# Patient Record
Sex: MALE | Race: ASIAN | NOT HISPANIC OR LATINO | URBAN - METROPOLITAN AREA
[De-identification: names, ages, dates, MRNs, and addresses within clinical notes are randomized per-mention and may not be internally consistent; named-entity substitution may affect disease eponyms.]

---

## 2020-10-06 ENCOUNTER — INPATIENT (INPATIENT)
Facility: HOSPITAL | Age: 75
LOS: 1 days | Discharge: HOME | DRG: 247 | End: 2020-10-08
Attending: INTERNAL MEDICINE | Admitting: HOSPITALIST
Payer: COMMERCIAL

## 2020-10-06 VITALS
DIASTOLIC BLOOD PRESSURE: 90 MMHG | SYSTOLIC BLOOD PRESSURE: 184 MMHG | OXYGEN SATURATION: 98 % | HEIGHT: 70 IN | TEMPERATURE: 98 F | WEIGHT: 169.98 LBS | HEART RATE: 75 BPM | RESPIRATION RATE: 18 BRPM

## 2020-10-06 DIAGNOSIS — R14.0 ABDOMINAL DISTENSION (GASEOUS): ICD-10-CM

## 2020-10-06 DIAGNOSIS — Z29.9 ENCOUNTER FOR PROPHYLACTIC MEASURES, UNSPECIFIED: ICD-10-CM

## 2020-10-06 DIAGNOSIS — R06.2 WHEEZING: ICD-10-CM

## 2020-10-06 DIAGNOSIS — R07.9 CHEST PAIN, UNSPECIFIED: ICD-10-CM

## 2020-10-06 DIAGNOSIS — E78.5 HYPERLIPIDEMIA, UNSPECIFIED: ICD-10-CM

## 2020-10-06 DIAGNOSIS — N40.0 BENIGN PROSTATIC HYPERPLASIA WITHOUT LOWER URINARY TRACT SYMPTOMS: ICD-10-CM

## 2020-10-06 DIAGNOSIS — N18.30 CHRONIC KIDNEY DISEASE, STAGE 3 UNSPECIFIED: ICD-10-CM

## 2020-10-06 DIAGNOSIS — I10 ESSENTIAL (PRIMARY) HYPERTENSION: ICD-10-CM

## 2020-10-06 LAB
ALBUMIN SERPL ELPH-MCNC: 4.3 G/DL — SIGNIFICANT CHANGE UP (ref 3.3–5)
ALP SERPL-CCNC: 87 U/L — SIGNIFICANT CHANGE UP (ref 40–120)
ALT FLD-CCNC: 17 U/L — SIGNIFICANT CHANGE UP (ref 10–45)
ANION GAP SERPL CALC-SCNC: 10 MMOL/L — SIGNIFICANT CHANGE UP (ref 5–17)
APTT BLD: 64 SEC — HIGH (ref 27.5–35.5)
AST SERPL-CCNC: 21 U/L — SIGNIFICANT CHANGE UP (ref 10–40)
BASOPHILS # BLD AUTO: 0.04 K/UL — SIGNIFICANT CHANGE UP (ref 0–0.2)
BASOPHILS NFR BLD AUTO: 0.8 % — SIGNIFICANT CHANGE UP (ref 0–2)
BILIRUB SERPL-MCNC: 1 MG/DL — SIGNIFICANT CHANGE UP (ref 0.2–1.2)
BUN SERPL-MCNC: 13 MG/DL — SIGNIFICANT CHANGE UP (ref 7–23)
CALCIUM SERPL-MCNC: 10.3 MG/DL — SIGNIFICANT CHANGE UP (ref 8.4–10.5)
CHLORIDE SERPL-SCNC: 102 MMOL/L — SIGNIFICANT CHANGE UP (ref 96–108)
CO2 SERPL-SCNC: 27 MMOL/L — SIGNIFICANT CHANGE UP (ref 22–31)
CREAT SERPL-MCNC: 1.59 MG/DL — HIGH (ref 0.5–1.3)
EOSINOPHIL # BLD AUTO: 0.11 K/UL — SIGNIFICANT CHANGE UP (ref 0–0.5)
EOSINOPHIL NFR BLD AUTO: 2.1 % — SIGNIFICANT CHANGE UP (ref 0–6)
GLUCOSE SERPL-MCNC: 92 MG/DL — SIGNIFICANT CHANGE UP (ref 70–99)
HCT VFR BLD CALC: 43.4 % — SIGNIFICANT CHANGE UP (ref 39–50)
HGB BLD-MCNC: 13.9 G/DL — SIGNIFICANT CHANGE UP (ref 13–17)
IMM GRANULOCYTES NFR BLD AUTO: 0.4 % — SIGNIFICANT CHANGE UP (ref 0–1.5)
INR BLD: 1 RATIO — SIGNIFICANT CHANGE UP (ref 0.88–1.16)
LYMPHOCYTES # BLD AUTO: 1.56 K/UL — SIGNIFICANT CHANGE UP (ref 1–3.3)
LYMPHOCYTES # BLD AUTO: 29.5 % — SIGNIFICANT CHANGE UP (ref 13–44)
MAGNESIUM SERPL-MCNC: 2.1 MG/DL — SIGNIFICANT CHANGE UP (ref 1.6–2.6)
MCHC RBC-ENTMCNC: 29.8 PG — SIGNIFICANT CHANGE UP (ref 27–34)
MCHC RBC-ENTMCNC: 32 GM/DL — SIGNIFICANT CHANGE UP (ref 32–36)
MCV RBC AUTO: 92.9 FL — SIGNIFICANT CHANGE UP (ref 80–100)
MONOCYTES # BLD AUTO: 0.48 K/UL — SIGNIFICANT CHANGE UP (ref 0–0.9)
MONOCYTES NFR BLD AUTO: 9.1 % — SIGNIFICANT CHANGE UP (ref 2–14)
NEUTROPHILS # BLD AUTO: 3.08 K/UL — SIGNIFICANT CHANGE UP (ref 1.8–7.4)
NEUTROPHILS NFR BLD AUTO: 58.1 % — SIGNIFICANT CHANGE UP (ref 43–77)
NRBC # BLD: 0 /100 WBCS — SIGNIFICANT CHANGE UP (ref 0–0)
NT-PROBNP SERPL-SCNC: 247 PG/ML — SIGNIFICANT CHANGE UP (ref 0–300)
PLATELET # BLD AUTO: 159 K/UL — SIGNIFICANT CHANGE UP (ref 150–400)
POTASSIUM SERPL-MCNC: 4 MMOL/L — SIGNIFICANT CHANGE UP (ref 3.5–5.3)
POTASSIUM SERPL-SCNC: 4 MMOL/L — SIGNIFICANT CHANGE UP (ref 3.5–5.3)
PROT SERPL-MCNC: 7.7 G/DL — SIGNIFICANT CHANGE UP (ref 6–8.3)
PROTHROM AB SERPL-ACNC: 12 SEC — SIGNIFICANT CHANGE UP (ref 10.6–13.6)
RAPID RVP RESULT: SIGNIFICANT CHANGE UP
RBC # BLD: 4.67 M/UL — SIGNIFICANT CHANGE UP (ref 4.2–5.8)
RBC # FLD: 13 % — SIGNIFICANT CHANGE UP (ref 10.3–14.5)
SARS-COV-2 RNA SPEC QL NAA+PROBE: SIGNIFICANT CHANGE UP
SODIUM SERPL-SCNC: 139 MMOL/L — SIGNIFICANT CHANGE UP (ref 135–145)
TROPONIN T, HIGH SENSITIVITY RESULT: 22 NG/L — SIGNIFICANT CHANGE UP (ref 0–51)
WBC # BLD: 5.29 K/UL — SIGNIFICANT CHANGE UP (ref 3.8–10.5)
WBC # FLD AUTO: 5.29 K/UL — SIGNIFICANT CHANGE UP (ref 3.8–10.5)

## 2020-10-06 PROCEDURE — 93010 ELECTROCARDIOGRAM REPORT: CPT

## 2020-10-06 PROCEDURE — 71045 X-RAY EXAM CHEST 1 VIEW: CPT | Mod: 26

## 2020-10-06 PROCEDURE — 99222 1ST HOSP IP/OBS MODERATE 55: CPT | Mod: GC

## 2020-10-06 PROCEDURE — 99285 EMERGENCY DEPT VISIT HI MDM: CPT

## 2020-10-06 PROCEDURE — 99223 1ST HOSP IP/OBS HIGH 75: CPT

## 2020-10-06 RX ORDER — ASPIRIN/CALCIUM CARB/MAGNESIUM 324 MG
81 TABLET ORAL DAILY
Refills: 0 | Status: DISCONTINUED | OUTPATIENT
Start: 2020-10-07 | End: 2020-10-08

## 2020-10-06 RX ORDER — LOSARTAN POTASSIUM 100 MG/1
100 TABLET, FILM COATED ORAL DAILY
Refills: 0 | Status: DISCONTINUED | OUTPATIENT
Start: 2020-10-07 | End: 2020-10-08

## 2020-10-06 RX ORDER — LANOLIN ALCOHOL/MO/W.PET/CERES
3 CREAM (GRAM) TOPICAL AT BEDTIME
Refills: 0 | Status: DISCONTINUED | OUTPATIENT
Start: 2020-10-06 | End: 2020-10-08

## 2020-10-06 RX ORDER — BUDESONIDE AND FORMOTEROL FUMARATE DIHYDRATE 160; 4.5 UG/1; UG/1
2 AEROSOL RESPIRATORY (INHALATION)
Refills: 0 | Status: DISCONTINUED | OUTPATIENT
Start: 2020-10-06 | End: 2020-10-08

## 2020-10-06 RX ORDER — HYDROCHLOROTHIAZIDE 25 MG
12.5 TABLET ORAL DAILY
Refills: 0 | Status: DISCONTINUED | OUTPATIENT
Start: 2020-10-07 | End: 2020-10-08

## 2020-10-06 RX ORDER — INFLUENZA VIRUS VACCINE 15; 15; 15; 15 UG/.5ML; UG/.5ML; UG/.5ML; UG/.5ML
0.5 SUSPENSION INTRAMUSCULAR ONCE
Refills: 0 | Status: COMPLETED | OUTPATIENT
Start: 2020-10-06 | End: 2020-10-08

## 2020-10-06 RX ORDER — HEPARIN SODIUM 5000 [USP'U]/ML
5000 INJECTION INTRAVENOUS; SUBCUTANEOUS EVERY 8 HOURS
Refills: 0 | Status: DISCONTINUED | OUTPATIENT
Start: 2020-10-06 | End: 2020-10-08

## 2020-10-06 RX ORDER — METOPROLOL TARTRATE 50 MG
50 TABLET ORAL
Refills: 0 | Status: DISCONTINUED | OUTPATIENT
Start: 2020-10-06 | End: 2020-10-08

## 2020-10-06 RX ORDER — ISOSORBIDE DINITRATE 5 MG/1
5 TABLET ORAL THREE TIMES A DAY
Refills: 0 | Status: DISCONTINUED | OUTPATIENT
Start: 2020-10-07 | End: 2020-10-08

## 2020-10-06 RX ORDER — TAMSULOSIN HYDROCHLORIDE 0.4 MG/1
0.4 CAPSULE ORAL AT BEDTIME
Refills: 0 | Status: DISCONTINUED | OUTPATIENT
Start: 2020-10-06 | End: 2020-10-08

## 2020-10-06 RX ADMIN — Medication 50 MILLIGRAM(S): at 23:12

## 2020-10-06 RX ADMIN — TAMSULOSIN HYDROCHLORIDE 0.4 MILLIGRAM(S): 0.4 CAPSULE ORAL at 21:44

## 2020-10-06 RX ADMIN — HEPARIN SODIUM 5000 UNIT(S): 5000 INJECTION INTRAVENOUS; SUBCUTANEOUS at 21:44

## 2020-10-06 RX ADMIN — Medication 3 MILLIGRAM(S): at 23:35

## 2020-10-06 NOTE — ED ADULT NURSE NOTE - NSIMPLEMENTINTERV_GEN_ALL_ED
Implemented All Fall with Harm Risk Interventions:  [place] to call system. Call bell, personal items and telephone within reach. Instruct patient to call for assistance. Room bathroom lighting operational. Non-slip footwear when patient is off stretcher. Physically safe environment: no spills, clutter or unnecessary equipment. Stretcher in lowest position, wheels locked, appropriate side rails in place. Provide visual cue, wrist band, yellow gown, etc. Monitor gait and stability. Monitor for mental status changes and reorient to person, place, and time. Review medications for side effects contributing to fall risk. Reinforce activity limits and safety measures with patient and family. Provide visual clues: red socks.

## 2020-10-06 NOTE — ED PROVIDER NOTE - OBJECTIVE STATEMENT
76 yo M with pmhx of HTN, HLD, CAD (stents 10yrs ago) presents with 6mths of intermittent chest pain with exertion and shortness of breath with exertion, and lower extremity swelling. Sent in by Dr. Jese Duran for admission for angio cath. Denies fever, chills, n/v/d/abd pain. Denies smoking, hx of COPD

## 2020-10-06 NOTE — H&P ADULT - HISTORY OF PRESENT ILLNESS
75M w/ CAD s/p stents, HTN, HLD, former smoker (quit 30 yr ago, 20pack year) enlarged prostate presents to [facility] for evaluation of chest pain per outpatient cardiology. Patient reports that he has been experiencing intermittent chest pain on exertion over the last 6 months. The pain has been progressive midsternal, "choking" character, 5-6/10 severity when ocurrsno reported radiation, associated w/ LEMON, associated with abdominal swelling and b/l lower extremity swelling, improves with rest. He reports that his exercise tolerance has been worsening and when attempting to walk one block will have to stop 3 to 4 times because of the discomfort. There is no CP, palpitations or sob at rest. He reports that he can lie flat at night time however there are times when he will awakening with sob which improves by sitting upright. Reports chronic cough for years with minimal sputum production which has not changed in character or frequency in the last year. No reported fever, chills, n/v/d, painful urination or change in urinary. Family history significant for MI in parents and siblings some occurring at early age in 30's-40's.    Per ED chart, patient received ASA 162mg x1 by EMS. Also demonstrated depression deemed to not have active SI/HI. Patient reports sadness that he will not get better but denies on medicine admit active SI/HI.    In the ED, VS 98.4, 184/90, 75, 18 98%RA

## 2020-10-06 NOTE — H&P ADULT - NSHPREVIEWOFSYSTEMS_GEN_ALL_CORE
CONSTITUTIONAL: No fever, no chills  EYES: No eye pain, no acute blindness  Mouth: no pain in mouth, no cuts  RESPIRATORY: chronic cough+, sob with exertion  CARDIOVASCULAR: CP with exertion, no palpitations  GASTROINTESTINAL: no abdominal pain, no n/v/d  GENITOURINARY: No dysuria, no hematuria  Heme: No easy bruising, no swelling of neck  NEUROLOGICAL: No seizure, No acute paralysis  SKIN: No itching, no rashes  MUSCULOSKELETAL: No acute joint pain, no joint swelling

## 2020-10-06 NOTE — ED PROVIDER NOTE - PROGRESS NOTE DETAILS
Resident Hersimran: cardiology consutled - they plan to do angio tomorrow. Want the patient to be NPO starting midnight. Want 8hrs trop (which is ordered).   Discussed case with Dr. Horn - agrees with admission

## 2020-10-06 NOTE — H&P ADULT - PROBLEM SELECTOR PLAN 3
cardiac work up as above  would benefit from RUQ US given LE swelling should cardiac eval not be consistent w/ cardiac etiology of fluid overload

## 2020-10-06 NOTE — H&P ADULT - PROBLEM SELECTOR PLAN 5
monitor CrCl daily  suspect admit Cr c/w chronic disease likely related to HTN/cardiovascular disease

## 2020-10-06 NOTE — H&P ADULT - PROBLEM SELECTOR PLAN 1
- cardiology evaluation reviewed. plan for [facility] 10/7/20  - no active chest pain at rest. agree less likely acs process  - c/w asa 81d, statin, metoprolol, ARB(losartan while inpatient)  - tele monitoring. troponin trend overnight. - cardiology evaluation reviewed. plan for [facility] 10/7/20  - no active chest pain at rest. agree less likely acs process  - c/w asa 81d, statin, metoprolol, ARB(losartan while inpatient), isodril  - tele monitoring. troponin trend overnight.

## 2020-10-06 NOTE — H&P ADULT - NSICDXPASTMEDICALHX_GEN_ALL_CORE_FT
PAST MEDICAL HISTORY:  Coronary Artery Disease treated medically    Hyperlipemia     Hypertension     Myocardial Infarction

## 2020-10-06 NOTE — ED PROVIDER NOTE - ATTENDING CONTRIBUTION TO CARE
75 M w/ pmh of cad s/p stents presents to the ER from Dr. Duran's  presents to the ER w/ chest pain and LEMON. Pt received two baby asa from EMS en route to the Er. Upon arrival to the ER he has 75 M w/ pmh of cad s/p stents over 10 years ago at [facility] (no records), HTN, HLD and prior smoker, presents to the ER from Dr. Duran's  presents to the ER w/ chest pain and LEMON. Pt received two baby asa from EMS en route to the Er. Now presents with chest pain for 6-8 months, now worsening. Given his history of CAD and risk factors, he likely has worsening CAD given his symptoms.  was sent to er for cath. Pt is nontoxic appearing I have reviewed the triage vital signs. Const: Well-nourished, Well-developed, Eyes: no conjunctival injection and no scleral icterus ENMT: Moist mucus membranes, CVS: +S1/S2, radial/DP pulse 2+ bilaterally RESP: Unlabored respiratory effort, Clear to auscultation bilaterally GI: Nontender/Nondistended soft abdomen, no CVA tenderness MSK: Extremities w/o deformity or ttp, Psych: Awake, Alert, & Orientedx3;  Appropriate mood and affect, cooperative   Will have labs, EKG and admission for cath per cardiology recommendations.

## 2020-10-06 NOTE — H&P ADULT - NSHPLABSRESULTS_GEN_ALL_CORE
Personally reviewed available labs, imaging and ekg  CBC Full  -  ( 06 Oct 2020 16:35 )  WBC Count : 5.29 K/uL  RBC Count : 4.67 M/uL  Hemoglobin : 13.9 g/dL  Hematocrit : 43.4 %  Platelet Count - Automated : 159 K/uL  Mean Cell Volume : 92.9 fl  Mean Cell Hemoglobin : 29.8 pg  Mean Cell Hemoglobin Concentration : 32.0 gm/dL  Auto Neutrophil # : 3.08 K/uL  Auto Lymphocyte # : 1.56 K/uL  Auto Monocyte # : 0.48 K/uL  Auto Eosinophil # : 0.11 K/uL  Auto Basophil # : 0.04 K/uL  Auto Neutrophil % : 58.1 %  Auto Lymphocyte % : 29.5 %  Auto Monocyte % : 9.1 %  Auto Eosinophil % : 2.1 %  Auto Basophil % : 0.8 %  10-06  139  |  102  |  13  ----------------------------<  92  4.0   |  27  |  1.59<H>  Ca    10.3      06 Oct 2020 16:35  Mg     2.1     10-06  TPro  7.7  /  Alb  4.3  /  TBili  1.0  /  DBili  x   /  AST  21  /  ALT  17  /  AlkPhos  87  10-06  PT/INR - ( 06 Oct 2020 16:35 )   PT: 12.0 sec;   INR: 1.00 ratio    PTT - ( 06 Oct 2020 16:35 )  PTT:64.0 sec  Troponin T, High Sensitivity Result: 22:(10.06.20 @ 16:35)  Serum Pro-Brain Natriuretic Peptide: 247 pg/mL (10.06.20 @ 16:35)    Imaging:  CXR does not demonstrate lobar opacification  EKG: sinus 80 with pvc, no georgette c/w stemi present

## 2020-10-06 NOTE — H&P ADULT - NSHPPHYSICALEXAM_GEN_ALL_CORE
Vital Signs Last 24 Hrs  T(C): 36.7 (06 Oct 2020 20:00), Max: 36.9 (06 Oct 2020 15:42)  T(F): 98.1 (06 Oct 2020 20:00), Max: 98.4 (06 Oct 2020 15:42)  HR: 71 (06 Oct 2020 20:00) (61 - 81)  BP: 163/75 (06 Oct 2020 20:00) (158/89 - 184/90)  RR: 18 (06 Oct 2020 20:00) (18 - 18)  SpO2: 98% (06 Oct 2020 20:00) (98% - 100%) on RA    GENERAL: NAD, well-developed  HEAD:  Atraumatic, Normocephalic  EYES: EOMI, PERRL, conjunctiva and sclera clear  Mouth: MMM, no lesions  NECK: Supple, no appreciable masses  Lung: normal work of breathing, b/l wheeze in bases and mid lung  Chest: S1&S2+, rrr, no m/r/g appreciated  ABDOMEN: bs+, soft, nt, abdominal distention however it is not tense  : No goncalves catheter, no CVA tenderness  EXTREMITIES:  radial pulse present b/l, PT present b/l, pitting edema below knee b/l present  Neuro: A&Ox3, no flaccid paralysis in extremities appreciated  SKIN: warm and dry, no visible purulence in exposed areas

## 2020-10-06 NOTE — H&P ADULT - PROBLEM SELECTOR PLAN 2
suspect patient has COPD given history of smoking and chronic cough w/ mild mucous production  - pending cardiac w/u would likely benefit from CT chest following cards w/u.  - empirically will start symbicort  - CXR does not demonstrate acute pathology, and no overt acute heart failure on medicine admit exam

## 2020-10-06 NOTE — ED PROVIDER NOTE - CLINICAL SUMMARY MEDICAL DECISION MAKING FREE TEXT BOX
6mths of chest pain and shortness of breath with exertion. Sent in by cardiologist for cardiac angio. Elevated BP. Labs, CXR.

## 2020-10-06 NOTE — CONSULT NOTE ADULT - SUBJECTIVE AND OBJECTIVE BOX
Patient seen and evaluated at bedside    Chief Complaint: Chest pain x 6 monhts    HPI:  75M with a history of CAD s/p PCI over 10 years ago at [facility] (no records), HTN, HLD and prior smoker who presented from his cardiologist's office (Dr. Eloy Castellano) for cardiac cath. Patient has had 6-8 months of worsening central sharp chest pain without radiation. Currently, it is 4/10 and is associated with shortness of breath with exertion. At rest he has minimal symptoms. Of note, he has gained 10-20 pounds around this time with fluid accumulating mostly in his abdomen. He otherwise denied syncope, palpitations, orthopnea, PND, f/c/n/v/d. Per report from his cardiologist, he has had a recent echo and stress test, both of which were unremarkable.    PMHx:   CAD s/p PCI 10 years ago  HTN  HLD    PSHx:   NA    Allergies:  penicillin (Rash)      Home Meds:  ASA  Statin  Metoprolol  Imdur  Valsartan    Current Medications:       FAMILY HISTORY:  MI's in mother, father and siblings    Social History:   Smoking History: prior user  Alcohol Use: NA  Drug Use: NA    REVIEW OF SYSTEMS:  Constitutional:     [x ] negative [ ] fevers [ ] chills [ ] weight loss [ ] weight gain  HEENT:                  [x ] negative [ ] dry eyes [ ] eye irritation [ ] postnasal drip [ ] nasal congestion  CV:                        see above [ ] negative  [ ] chest pain [ ] orthopnea [ ] palpitations [ ] murmur  Resp:                     [x] negative [ ] cough  [ ] dyspnea [ ] wheezing [ ] sputum [ ]hemoptysis  GI:                          [ x] negative [ ] nausea [ ] vomiting [ ] diarrhea [ ] constipation [ ] abd pain [ ] dysphagia   :                        [x ] negative [ ] dysuria [ ] nocturia [ ] hematuria [ ] increased urinary frequency  Musculoskeletal: [x ] negative [ ] back pain [ ] myalgias [ ] arthralgias [ ] fracture  Skin:                       [x ] negative [ ] rash [ ] itch  Neurological:        [x ] negative [ ] headache [ ] dizziness [ ] syncope [ ] weakness [ ] numbness  Psychiatric:           [ x] negative [ ] anxiety [ ] depression  Endocrine:            [x ] negative [ ] diabetes [ ] thyroid problem  Heme/Lymph:      [x ] negative [ ] anemia [ ] bleeding problem  Allergic/Immune: x[ ] negative [ ] itchy eyes [ ] nasal discharge [ ] hives [ ] angioedema    [x ] All other systems negative  [ ] Unable to assess ROS due to    Physical Exam:  T(F): 97.7 (10-06), Max: 98.4 (10-06)  HR: 81 (10-06) (75 - 81)  BP: 175/93 (10-06) (175/93 - 184/90)  RR: 18 (10-06)  SpO2: 100% (10-06)  GENERAL: No acute distress, well-developed  HEAD:  Atraumatic, Normocephalic  ENT: EOMI, PERRLA, conjunctiva and sclera clear, Neck supple, moist mucosa  CHEST/LUNG: Clear to auscultation bilaterally; No wheeze, equal breath sounds bilaterally   BACK: No spinal tenderness  HEART: Regular rate and rhythm; No murmurs, rubs, or gallops; JVD to 1/3 neck at 30 degrees  ABDOMEN: Soft, Nontender, distended; Bowel sounds present  EXTREMITIES:  No clubbing, cyanosis, or edema  PSYCH: Nl behavior, nl affect  NEUROLOGY: AAOx3, non-focal, cranial nerves intact  SKIN: Normal color, No rashes or lesions    Cardiovascular Diagnostic Testing:    ECG: Personally reviewed: NSR w/ PVCs, unchanged rom 2019    Echo: reported by his cardiologist to be unremarkable    Stress Testing: reported by his cardiologist to be unremarkable    Cath: no records available from [facility] regarding last cath    Imaging:    CXR: Personally reviewed    Labs: Personally reviewed                        13.9   5.29  )-----------( 159      ( 06 Oct 2020 16:35 )             43.4     10-06    139  |  102  |  13  ----------------------------<  92  4.0   |  27  |  1.59<H>    Ca    10.3      06 Oct 2020 16:35  Mg     2.1     10-06    TPro  7.7  /  Alb  4.3  /  TBili  1.0  /  DBili  x   /  AST  21  /  ALT  17  /  AlkPhos  87  10-06    PT/INR - ( 06 Oct 2020 16:35 )   PT: 12.0 sec;   INR: 1.00 ratio         PTT - ( 06 Oct 2020 16:35 )  PTT:64.0 sec  Serum Pro-Brain Natriuretic Peptide: 247 pg/mL (10-06 @ 16:35)         Patient seen and evaluated at bedside    Chief Complaint: Chest pain x 6 monhts    HPI:  75M with a history of CAD s/p PCI over 10 years ago at [facility] (no records), HTN, HLD and prior smoker who presented from his cardiologist's office (Dr. Eloy Castellano) for cardiac cath. Patient has had 6-8 months of worsening central sharp chest pain without radiation. Currently, it is 4/10 and is associated with shortness of breath with exertion. At rest he has minimal symptoms. Of note, he has gained 10-20 pounds around this time with fluid accumulating mostly in his abdomen. He otherwise denied syncope, palpitations, orthopnea, PND, f/c/n/v/d. Per report from his cardiologist, he has had a recent echo and stress test, both of which were unremarkable.    PMHx:   CAD s/p PCI 10 years ago  HTN  HLD    PSHx:   NA    Allergies:  penicillin (Rash)      Home Meds:  ASA  Statin  Metoprolol  Imdur  Valsartan    Current Medications:       FAMILY HISTORY:  MI's in mother, father and siblings    Social History:   Smoking History: prior user  Alcohol Use: NA  Drug Use: NA    REVIEW OF SYSTEMS:  Constitutional:     [x ] negative [ ] fevers [ ] chills [ ] weight loss [ ] weight gain  HEENT:                  [x ] negative [ ] dry eyes [ ] eye irritation [ ] postnasal drip [ ] nasal congestion  CV:                        see above [ ] negative  [ ] chest pain [ ] orthopnea [ ] palpitations [ ] murmur  Resp:                     [x] negative [ ] cough  [ ] dyspnea [ ] wheezing [ ] sputum [ ]hemoptysis  GI:                          [ x] negative [ ] nausea [ ] vomiting [ ] diarrhea [ ] constipation [ ] abd pain [ ] dysphagia   :                        [x ] negative [ ] dysuria [ ] nocturia [ ] hematuria [ ] increased urinary frequency  Musculoskeletal: [x ] negative [ ] back pain [ ] myalgias [ ] arthralgias [ ] fracture  Skin:                       [x ] negative [ ] rash [ ] itch  Neurological:        [x ] negative [ ] headache [ ] dizziness [ ] syncope [ ] weakness [ ] numbness  Psychiatric:           [ x] negative [ ] anxiety [ ] depression  Endocrine:            [x ] negative [ ] diabetes [ ] thyroid problem  Heme/Lymph:      [x ] negative [ ] anemia [ ] bleeding problem  Allergic/Immune: x[ ] negative [ ] itchy eyes [ ] nasal discharge [ ] hives [ ] angioedema    [x ] All other systems negative  [ ] Unable to assess ROS due to    Physical Exam:  T(F): 97.7 (10-06), Max: 98.4 (10-06)  HR: 81 (10-06) (75 - 81)  BP: 175/93 (10-06) (175/93 - 184/90)  RR: 18 (10-06)  SpO2: 100% (10-06)  GENERAL: No acute distress, well-developed  HEAD:  Atraumatic, Normocephalic  ENT: EOMI, PERRLA, conjunctiva and sclera clear, Neck supple, moist mucosa  CHEST/LUNG: Clear to auscultation bilaterally; No wheeze, equal breath sounds bilaterally   BACK: No spinal tenderness  HEART: Regular rate and rhythm; No murmurs, rubs, or gallops; no JVD  ABDOMEN: Soft, Nontender, distended; Bowel sounds present  EXTREMITIES:  No clubbing, cyanosis, or edema  PSYCH: Nl behavior, nl affect  NEUROLOGY: AAOx3, non-focal, cranial nerves intact  SKIN: Normal color, No rashes or lesions    Cardiovascular Diagnostic Testing:    ECG: Personally reviewed: NSR w/ PVCs, unchanged rom 2019    Echo: reported by his cardiologist to be unremarkable    Stress Testing: reported by his cardiologist to be unremarkable    Cath: no records available from [facility] regarding last cath    Imaging:    CXR: Personally reviewed    Labs: Personally reviewed                        13.9   5.29  )-----------( 159      ( 06 Oct 2020 16:35 )             43.4     10-06    139  |  102  |  13  ----------------------------<  92  4.0   |  27  |  1.59<H>    Ca    10.3      06 Oct 2020 16:35  Mg     2.1     10-06    TPro  7.7  /  Alb  4.3  /  TBili  1.0  /  DBili  x   /  AST  21  /  ALT  17  /  AlkPhos  87  10-06    PT/INR - ( 06 Oct 2020 16:35 )   PT: 12.0 sec;   INR: 1.00 ratio         PTT - ( 06 Oct 2020 16:35 )  PTT:64.0 sec  Serum Pro-Brain Natriuretic Peptide: 247 pg/mL (10-06 @ 16:35)         Patient seen and evaluated at bedside    Chief Complaint: Chest pain x 6 monhts    HPI:  75M with a history of CAD s/p PCI over 10 years ago at [facility] (no records), HTN, HLD and prior smoker.  Presents from his cardiologist's office (Dr. Eloy Castellano) for cardiac cath. Patient has had 6-8 months of worsening central sharp chest pain without radiation. Currently, it is 4/10 and is associated with shortness of breath with exertion. At rest he has minimal symptoms. Of note, he has gained 10-20 pounds around this time.  He denies syncope, palpitations, orthopnea, PND.   Per report from his cardiologist, he has had a recent echo and stress test, both of which were unremarkable.      PMHx:   CAD s/p PCI 10 years ago  HTN  HLD    Allergies:  penicillin (Rash)    Home Medications:  Aspirin Enteric Coated 81 mg oral delayed release tablet: 1 tab(s) orally once a day (06 Oct 2020 21:00)  isosorbide dinitrate 5 mg oral tablet: 1 tab(s) orally once a day (06 Oct 2020 21:00)  Metoprolol Tartrate 50 mg oral tablet: 1 tab(s) orally 2 times a day (06 Oct 2020 21:00)  tamsulosin 0.4 mg oral capsule: 1 cap(s) orally once a day (06 Oct 2020 21:00)  valsartan-hydrochlorothiazide 320mg-12.5mg oral tablet: 1 tab(s) orally once a day (06 Oct 2020 21:00)  statin      FAMILY HISTORY: MI's in mother, father and siblings    Social History:   Smoking History: prior user  Alcohol Use: NA  Drug Use: NA    REVIEW OF SYSTEMS:  Constitutional:     [x ] negative [ ] fevers [ ] chills [ ] weight loss [ ] weight gain  HEENT:                  [x ] negative [ ] dry eyes [ ] eye irritation [ ] postnasal drip [ ] nasal congestion  Resp:                     [x] negative [ ] cough  [ ] dyspnea [ ] wheezing [ ] sputum [ ]hemoptysis  GI:                          [ x] negative [ ] nausea [ ] vomiting [ ] diarrhea [ ] constipation [ ] abd pain [ ] dysphagia   :                        [x ] negative [ ] dysuria [ ] nocturia [ ] hematuria [ ] increased urinary frequency  Musculoskeletal: [x ] negative [ ] back pain [ ] myalgias [ ] arthralgias [ ] fracture  Skin:                       [x ] negative [ ] rash [ ] itch  Neurological:        [x ] negative [ ] headache [ ] dizziness [ ] syncope [ ] weakness [ ] numbness  Psychiatric:           [ x] negative [ ] anxiety [ ] depression  Endocrine:            [x ] negative [ ] diabetes [ ] thyroid problem  Heme/Lymph:      [x ] negative [ ] anemia [ ] bleeding problem  Allergic/Immune: x[ ] negative [ ] itchy eyes [ ] nasal discharge [ ] hives [ ] angioedema    [x ] All other systems negative      Physical Exam:  T(F): 97.7 (10-06), Max: 98.4 (10-06)  HR: 81 (10-06) (75 - 81)  BP: 175/93 (10-06) (175/93 - 184/90)  RR: 18 (10-06)    SpO2: 100% (10-06)  GENERAL: No acute distress, well-developed  HEAD:  Atraumatic, Normocephalic  ENT: EOMI, PERRLA, conjunctiva and sclera clear, Neck supple, moist mucosa  CHEST/LUNG: Clear to auscultation bilaterally; No wheeze, equal breath sounds bilaterally   BACK: No spinal tenderness  HEART: Regular rate and rhythm; No murmurs, rubs, or gallops; no JVD  ABDOMEN: Soft, Nontender, distended; Bowel sounds present  EXTREMITIES:  No clubbing, cyanosis, or edema  PSYCH: Nl behavior, nl affect  NEUROLOGY: AAOx3, non-focal, cranial nerves intact  SKIN: Normal color, No rashes or lesions      ECG: NSR w/ PVCs, unchanged rom 2019    Echo: reported by his cardiologist to be unremarkable    Stress Testing: reported by his cardiologist to be unremarkable    Cath: no records available from [facility] regarding last cath      Xray Chest 1 View AP/PA (10.06.20 @ 16:37)   EXAM:  XR CHEST AP OR PA 1V                        PROCEDURE DATE:  10/06/2020      INTERPRETATION:  CLINICAL INFORMATION: Chest pain, dyspnea on exertion and bilateral lower extremity swelling. Evaluate for pleural effusion.    TECHNIQUE: Frontal view of the chest.    COMPARISON: No comparison available.    FINDINGS:  The lungs are clear.  No pleural effusion or pneumothorax.  Heart size is within normal limits.  No acute abnormality within visible osseous structures.    IMPRESSION:   Clear lungs.    GER THOMASON M.D., RESIDENT RADIOLOGIST  This document has been electronically signed.  ERIC TREJO M.D., ATTENDING RADIOLOGIST  This document has been electronically signed. Oct  6 2020  8:36PM      Labs:                       13.9   5.29  )-----------( 159      ( 06 Oct 2020 16:35 )             43.4     10-06  139  |  102  |  13  ----------------------------<  92  4.0   |  27  |  1.59<H>    Ca    10.3      06 Oct 2020 16:35  Mg     2.1     10-06    TPro  7.7  /  Alb  4.3  /  TBili  1.0  /  DBili  x   /  AST  21  /  ALT  17  /  AlkPhos  87  10-06    PT/INR - ( 06 Oct 2020 16:35 )   PT: 12.0 sec;   INR: 1.00 ratio    PTT - ( 06 Oct 2020 16:35 )  PTT:64.0 sec    Serum Pro-Brain Natriuretic Peptide: 247 pg/mL (10-06 @ 16:35)

## 2020-10-06 NOTE — ED ADULT NURSE NOTE - OBJECTIVE STATEMENT
75 yr old male to Ed, Via [facility], from cardiologist, c/o SOB with exertional Chest pain intermittently. x 6 months. worsening last few days. Pt awake alert and orientedx3 Denies fever or chills Denies Covid. Pedal +2 pedal edema. 02 sat 100% on r/a Responds approp to verbal and tactile stimuli HX HTN, HLD , MI, X2 stents , Only takes ASA 162mg given per EMS. Kidney stone, poss BPH on Flomax. Denies abd pain . Denies N/V. Denies change in BM Denies diff or burn on urination. 75 yr old male to Ed, Via [facility], from cardiologist, c/o SOB with exertional Chest pain intermittently. x 6 months. worsening last few days. Pt awake alert and orientedx3 Denies fever or chills Denies Covid. Pedal +2 pedal edema. 02 sat 100% on r/a Responds approp to verbal and tactile stimuli HX HTN, HLD , MI, X2 stents , Only takes ASA 162mg given per EMS. Kidney stone, poss BPH on Flomax. Denies abd pain . Denies N/V. Denies change in BM Denies diff or burn on urination. MD attending A Petrossian spoke to pt in re to stating " I want to hurt myself." Pt states to MD, "  I have been feeling like this for years, I get sad when I think about everyone who has  and then I think I don't feel well." Denies a plan in regards to hurting himself psych con not necessary per ED MD. Pt on CM. Made comfortable.

## 2020-10-06 NOTE — H&P ADULT - ASSESSMENT
75M w/ CAD s/p stents, HTN, HLD, former smoker (quit 30 yr ago, 20pack year) enlarged prostate presents to [facility] for evaluation of chest pain per outpatient cardiology admit to medicine for further evaluation with LHC planned per cardiology.

## 2020-10-06 NOTE — ED ADULT NURSE REASSESSMENT NOTE - NS ED NURSE REASSESS COMMENT FT1
Received report from Hortensia SETHI. Patient A&Ox4. Denies any chest pain, shortness of breath, dizziness, nausea or vomiting, fever/chills currently. Reports having shortness of breath and chest pain upon exertion, none at rest. Pending admission to inpatient telemetry unit. Plan of care discussed. Safety and comfort measures maintained.

## 2020-10-06 NOTE — H&P ADULT - ATTENDING COMMENTS
Ricardo Sullivan MD  Nocturnist-Medicine Attending  Department of [facility] Medicine  pager: [phone] (available from 20:00 to 08:00)

## 2020-10-06 NOTE — CONSULT NOTE ADULT - ASSESSMENT
75M with a history of CAD s/p PCI over 10 years ago at [facility] (no records), HTN, HLD and prior smoker who presents with typical chest pain for 6-8 months, now worsening. Given his history of CAD and risk factors, he likely has worsening CAD given his symptoms. However, he does not appear to have ACS given the chronicity, lack of ECG changes and negative enzyme. Were he to rule in for ACS, his Ibeth score is 111 (6% probability of death at 6 months) and LANDON score is 3 (13% risk at 14 days). Currently, he is HDS and despite an increase in weight gain over this time frame, he is not hypoxic or in respiratory distress.  - admit to telemetry  - echo  - would not obtain stress test at this time since his primary cardiologist has already performed the test recently  - NPO for cath tomorrow, consent placed in chart  - con't home medications; will discuss medication optimization after cath  - will continue to follow  - discussed with cath attending, Dr. Jaguar Villegas MD  Cardiology Fellow PGY-4  [facility]  (w) [phone]    For all new consults and questions:  [email or web address]   Login: [email or web address]   75M with a history of CAD s/p PCI over 10 years ago at [facility] (no records), HTN, HLD and prior smoker who presents with typical chest pain for 6-8 months, now worsening. Given his history of CAD and risk factors, he likely has worsening CAD given his symptoms. However, he does not appear to have ACS given the chronicity, lack of ECG changes and negative enzyme. Were he to rule in for ACS, his Ibeth score is 111 (6% probability of death at 6 months) and LANDON score is 3 (13% risk at 14 days). Currently, he is HDS and despite an increase in weight gain over this time frame, he does not appear volume overloaded given normal BNP, negative CXR and adequate oxygenation without supplemental requirements.  - admit to telemetry  - would not obtain stress test at this time since his primary cardiologist has already performed the test recently  - NPO for cath tomorrow, consent placed in chart  - echo after cath  - con't home medications; will discuss medication optimization after cath  - will continue to follow  - discussed with cath attending, Dr. Jaguar Villegas MD  Cardiology Fellow PGY-4  [facility]  (w) [phone]    For all new consults and questions:  [email or web address]   Login: [email or web address]   75 M with a history of CAD s/p PCI over 10 years ago at [facility] (no records), HTN, HLD and prior smoker  Now presents with chest pain for 6-8 months, now worsening. Given his history of CAD and risk factors, he likely has worsening CAD given his symptoms. However, he does not appear to have ACS given the chronicity, lack of ECG changes and negative enzyme. Were he to rule in for ACS, his Ibeth score is 111 (6% probability of death at 6 months) and LANDON score is 3 (13% risk at 14 days). Currently, he is HDS and despite an increase in weight gain over this time frame, he does not appear volume overloaded given normal BNP, negative CXR and adequate oxygenation without supplemental requirements.  - admit to telemetry  - NPO for cath tomorrow, consent placed in chart  - echo after cath  - continue home medications; will discuss medication optimization after cath  - will continue to follow  - discussed with cath attending, Dr. Jaguar Villegas MD  Cardiology Fellow PGY-4  [facility] - [facility]  (w) [phone]    Humberto Herring M.D.  Cardiology Attending, Consult Service    All Cardiology service information can be found 24/7 on [email or web address], password: [email or web address]

## 2020-10-07 ENCOUNTER — TRANSCRIPTION ENCOUNTER (OUTPATIENT)
Age: 75
End: 2020-10-07

## 2020-10-07 DIAGNOSIS — Z02.9 ENCOUNTER FOR ADMINISTRATIVE EXAMINATIONS, UNSPECIFIED: ICD-10-CM

## 2020-10-07 DIAGNOSIS — E78.49 OTHER HYPERLIPIDEMIA: ICD-10-CM

## 2020-10-07 DIAGNOSIS — I20.9 ANGINA PECTORIS, UNSPECIFIED: ICD-10-CM

## 2020-10-07 LAB
ANION GAP SERPL CALC-SCNC: 9 MMOL/L — SIGNIFICANT CHANGE UP (ref 5–17)
APTT BLD: 69.4 SEC — HIGH (ref 27.5–35.5)
BUN SERPL-MCNC: 14 MG/DL — SIGNIFICANT CHANGE UP (ref 7–23)
CALCIUM SERPL-MCNC: 9.5 MG/DL — SIGNIFICANT CHANGE UP (ref 8.4–10.5)
CHLORIDE SERPL-SCNC: 103 MMOL/L — SIGNIFICANT CHANGE UP (ref 96–108)
CO2 SERPL-SCNC: 27 MMOL/L — SIGNIFICANT CHANGE UP (ref 22–31)
CREAT SERPL-MCNC: 1.58 MG/DL — HIGH (ref 0.5–1.3)
GLUCOSE SERPL-MCNC: 92 MG/DL — SIGNIFICANT CHANGE UP (ref 70–99)
HCT VFR BLD CALC: 41.5 % — SIGNIFICANT CHANGE UP (ref 39–50)
HCV AB S/CO SERPL IA: 0.1 S/CO — SIGNIFICANT CHANGE UP (ref 0–0.99)
HCV AB SERPL-IMP: SIGNIFICANT CHANGE UP
HGB BLD-MCNC: 13.4 G/DL — SIGNIFICANT CHANGE UP (ref 13–17)
INR BLD: 1.04 RATIO — SIGNIFICANT CHANGE UP (ref 0.88–1.16)
MAGNESIUM SERPL-MCNC: 2 MG/DL — SIGNIFICANT CHANGE UP (ref 1.6–2.6)
MCHC RBC-ENTMCNC: 29.9 PG — SIGNIFICANT CHANGE UP (ref 27–34)
MCHC RBC-ENTMCNC: 32.3 GM/DL — SIGNIFICANT CHANGE UP (ref 32–36)
MCV RBC AUTO: 92.6 FL — SIGNIFICANT CHANGE UP (ref 80–100)
NRBC # BLD: 0 /100 WBCS — SIGNIFICANT CHANGE UP (ref 0–0)
PHOSPHATE SERPL-MCNC: 3.9 MG/DL — SIGNIFICANT CHANGE UP (ref 2.5–4.5)
PLATELET # BLD AUTO: 141 K/UL — LOW (ref 150–400)
POTASSIUM SERPL-MCNC: 3.9 MMOL/L — SIGNIFICANT CHANGE UP (ref 3.5–5.3)
POTASSIUM SERPL-SCNC: 3.9 MMOL/L — SIGNIFICANT CHANGE UP (ref 3.5–5.3)
PROTHROM AB SERPL-ACNC: 12.5 SEC — SIGNIFICANT CHANGE UP (ref 10.6–13.6)
RBC # BLD: 4.48 M/UL — SIGNIFICANT CHANGE UP (ref 4.2–5.8)
RBC # FLD: 12.9 % — SIGNIFICANT CHANGE UP (ref 10.3–14.5)
SARS-COV-2 IGG SERPL QL IA: NEGATIVE — SIGNIFICANT CHANGE UP
SARS-COV-2 IGM SERPL IA-ACNC: 0.09 INDEX — SIGNIFICANT CHANGE UP
SODIUM SERPL-SCNC: 139 MMOL/L — SIGNIFICANT CHANGE UP (ref 135–145)
TROPONIN T, HIGH SENSITIVITY RESULT: 24 NG/L — SIGNIFICANT CHANGE UP (ref 0–51)
TROPONIN T, HIGH SENSITIVITY RESULT: 26 NG/L — SIGNIFICANT CHANGE UP (ref 0–51)
WBC # BLD: 4.68 K/UL — SIGNIFICANT CHANGE UP (ref 3.8–10.5)
WBC # FLD AUTO: 4.68 K/UL — SIGNIFICANT CHANGE UP (ref 3.8–10.5)

## 2020-10-07 PROCEDURE — 99152 MOD SED SAME PHYS/QHP 5/>YRS: CPT

## 2020-10-07 PROCEDURE — 93306 TTE W/DOPPLER COMPLETE: CPT | Mod: 26

## 2020-10-07 PROCEDURE — 99233 SBSQ HOSP IP/OBS HIGH 50: CPT | Mod: GC,25

## 2020-10-07 PROCEDURE — 93010 ELECTROCARDIOGRAM REPORT: CPT | Mod: 77

## 2020-10-07 PROCEDURE — 93010 ELECTROCARDIOGRAM REPORT: CPT | Mod: 76

## 2020-10-07 PROCEDURE — 92928 PRQ TCAT PLMT NTRAC ST 1 LES: CPT | Mod: LD,59

## 2020-10-07 PROCEDURE — 93571 IV DOP VEL&/PRESS C FLO 1ST: CPT | Mod: 26,LD

## 2020-10-07 PROCEDURE — 93458 L HRT ARTERY/VENTRICLE ANGIO: CPT | Mod: 26,59

## 2020-10-07 PROCEDURE — 99233 SBSQ HOSP IP/OBS HIGH 50: CPT

## 2020-10-07 RX ORDER — SODIUM CHLORIDE 9 MG/ML
250 INJECTION INTRAMUSCULAR; INTRAVENOUS; SUBCUTANEOUS ONCE
Refills: 0 | Status: COMPLETED | OUTPATIENT
Start: 2020-10-07 | End: 2020-10-07

## 2020-10-07 RX ORDER — NITROGLYCERIN 6.5 MG
0.5 CAPSULE, EXTENDED RELEASE ORAL ONCE
Refills: 0 | Status: COMPLETED | OUTPATIENT
Start: 2020-10-07 | End: 2020-10-07

## 2020-10-07 RX ORDER — CLOPIDOGREL BISULFATE 75 MG/1
75 TABLET, FILM COATED ORAL DAILY
Refills: 0 | Status: DISCONTINUED | OUTPATIENT
Start: 2020-10-08 | End: 2020-10-08

## 2020-10-07 RX ORDER — PANTOPRAZOLE SODIUM 20 MG/1
40 TABLET, DELAYED RELEASE ORAL ONCE
Refills: 0 | Status: COMPLETED | OUTPATIENT
Start: 2020-10-07 | End: 2020-10-07

## 2020-10-07 RX ORDER — ACETAMINOPHEN 500 MG
1000 TABLET ORAL ONCE
Refills: 0 | Status: COMPLETED | OUTPATIENT
Start: 2020-10-07 | End: 2020-10-07

## 2020-10-07 RX ORDER — SODIUM CHLORIDE 9 MG/ML
1000 INJECTION INTRAMUSCULAR; INTRAVENOUS; SUBCUTANEOUS
Refills: 0 | Status: DISCONTINUED | OUTPATIENT
Start: 2020-10-07 | End: 2020-10-08

## 2020-10-07 RX ORDER — ATORVASTATIN CALCIUM 80 MG/1
80 TABLET, FILM COATED ORAL AT BEDTIME
Refills: 0 | Status: DISCONTINUED | OUTPATIENT
Start: 2020-10-07 | End: 2020-10-08

## 2020-10-07 RX ADMIN — Medication 50 MILLIGRAM(S): at 11:52

## 2020-10-07 RX ADMIN — PANTOPRAZOLE SODIUM 40 MILLIGRAM(S): 20 TABLET, DELAYED RELEASE ORAL at 13:13

## 2020-10-07 RX ADMIN — Medication 400 MILLIGRAM(S): at 11:52

## 2020-10-07 RX ADMIN — SODIUM CHLORIDE 75 MILLILITER(S): 9 INJECTION INTRAMUSCULAR; INTRAVENOUS; SUBCUTANEOUS at 08:43

## 2020-10-07 RX ADMIN — Medication 50 MILLIGRAM(S): at 22:41

## 2020-10-07 RX ADMIN — BUDESONIDE AND FORMOTEROL FUMARATE DIHYDRATE 2 PUFF(S): 160; 4.5 AEROSOL RESPIRATORY (INHALATION) at 05:32

## 2020-10-07 RX ADMIN — TAMSULOSIN HYDROCHLORIDE 0.4 MILLIGRAM(S): 0.4 CAPSULE ORAL at 21:23

## 2020-10-07 RX ADMIN — Medication 0.5 INCH(S): at 14:18

## 2020-10-07 RX ADMIN — BUDESONIDE AND FORMOTEROL FUMARATE DIHYDRATE 2 PUFF(S): 160; 4.5 AEROSOL RESPIRATORY (INHALATION) at 18:21

## 2020-10-07 RX ADMIN — Medication 30 MILLILITER(S): at 11:52

## 2020-10-07 RX ADMIN — ISOSORBIDE DINITRATE 5 MILLIGRAM(S): 5 TABLET ORAL at 13:43

## 2020-10-07 RX ADMIN — Medication 81 MILLIGRAM(S): at 05:37

## 2020-10-07 RX ADMIN — ISOSORBIDE DINITRATE 5 MILLIGRAM(S): 5 TABLET ORAL at 21:23

## 2020-10-07 RX ADMIN — Medication 12.5 MILLIGRAM(S): at 05:31

## 2020-10-07 RX ADMIN — SODIUM CHLORIDE 750 MILLILITER(S): 9 INJECTION INTRAMUSCULAR; INTRAVENOUS; SUBCUTANEOUS at 08:44

## 2020-10-07 RX ADMIN — Medication 1000 MILLIGRAM(S): at 12:30

## 2020-10-07 RX ADMIN — Medication 3 MILLIGRAM(S): at 21:25

## 2020-10-07 RX ADMIN — ISOSORBIDE DINITRATE 5 MILLIGRAM(S): 5 TABLET ORAL at 05:32

## 2020-10-07 RX ADMIN — LOSARTAN POTASSIUM 100 MILLIGRAM(S): 100 TABLET, FILM COATED ORAL at 05:31

## 2020-10-07 RX ADMIN — ATORVASTATIN CALCIUM 80 MILLIGRAM(S): 80 TABLET, FILM COATED ORAL at 21:23

## 2020-10-07 RX ADMIN — HEPARIN SODIUM 5000 UNIT(S): 5000 INJECTION INTRAVENOUS; SUBCUTANEOUS at 21:23

## 2020-10-07 NOTE — DISCHARGE NOTE PROVIDER - NSDCCPCAREPLAN_GEN_ALL_CORE_FT
PRINCIPAL DISCHARGE DIAGNOSIS  Diagnosis: Chest pain  Assessment and Plan of Treatment: Do not stop your aspirin or Plavix unless instructed to do so by your cardiologist, they help keep your stented arteries open.   No heavy lifting or pushing/pulling with procedure arm for 2 weeks. No driving for 2 days. You may shower 24 hours following the procedure but avoid baths/swimming for 1 week. Check your wrist site for bleeding and/or swelling daily following procedure and call your doctor immediately if it occurs or if you experience increased pain at the site. Follow up with your cardiologist in 1-2 weeks. You may call [facility] if you have any questions/concerns regarding your procedure [phone].      SECONDARY DISCHARGE DIAGNOSES  Diagnosis: HTN (hypertension)  Assessment and Plan of Treatment: Continue with your blood pressure medications; eat a heart healthy diet with low salt diet; exercise regularly (consult with your physician or cardiologist first); maintain a heart healthy weight; if you smoke - quit (A resource to help you stop smoking is the [facility] [employer] – phone number [phone].); include healthy ways to manage stress. Continue to follow with your primary care physician or cardiologist.    Diagnosis: HLD (hyperlipidemia)  Assessment and Plan of Treatment: Continue with your cholesterol medications. Eat a heart healthy diet that is low in saturated fats and salt, and includes whole grains, fruits, vegetables and lean protein; exercise regularly (consult with your physician or cardiologist first); maintain a heart healthy weight; if you smoke - quit (A resource to help you stop smoking is the [facility] [employer] for Tobacco Control – phone number [phone].). Continue to follow with your primary physician or cardiologist.

## 2020-10-07 NOTE — CHART NOTE - NSCHARTNOTEFT_GEN_A_CORE
Pt c/o 7/10 chest pain post procedure: EKG without changes. VSS. Pt given Tylenol 1g IV and maalox without relief. Pt states pain is worse with lying down (ekg not c/w pericardititis). Will administer pantoprazole 40 mg po start and continue to monitor closely.    Santosh Carpenter, NP    x [phone]

## 2020-10-07 NOTE — PROGRESS NOTE ADULT - PROBLEM SELECTOR PROBLEM 5
Stage 3 chronic kidney disease, unspecified whether stage 3a or 3b CKD Hyperlipidemia, unspecified hyperlipidemia type

## 2020-10-07 NOTE — DISCHARGE NOTE PROVIDER - CARE PROVIDER_API CALL
Jaguar Bourne  CARDIOVASCULAR DISEASE  [address] [place] [address]  Phone: [phone]  Fax: [phone]  Follow Up Time: 2 weeks

## 2020-10-07 NOTE — PROGRESS NOTE ADULT - PROBLEM SELECTOR PLAN 5
monitor CrCl daily  suspect admit Cr c/w chronic disease likely related to HTN/cardiovascular disease Continue Lipitor 80 mg

## 2020-10-07 NOTE — PROGRESS NOTE ADULT - PROBLEM SELECTOR PLAN 3
cardiac work up as above  would benefit from RUQ US given LE swelling should cardiac eval not be consistent w/ cardiac etiology of fluid overload Continue metoprolol, losartan, HCTZ, Isordil

## 2020-10-07 NOTE — PROGRESS NOTE ADULT - ASSESSMENT
75M w/ CAD s/p stents, HTN, HLD, former smoker (quit 30 yr ago, 20pack year) enlarged prostate presents to [facility] for evaluation of chest pain s/p LHC with JONNY x 2 to LAD

## 2020-10-07 NOTE — PROGRESS NOTE ADULT - SUBJECTIVE AND OBJECTIVE BOX
Yogi Mejia MD  Pager [phone]  Spectra [phone]  Cell Phone [phone]    Patient is a 75y old  Male who presents with a chief complaint of 75M presents per outpatient cardiology for evaluation of chest pain (06 Oct 2020 21:10)        SUBJECTIVE / OVERNIGHT EVENTS: Patient seen s/p cath- c/o chest discomfort      MEDICATIONS  (STANDING):  aspirin enteric coated 81 milliGRAM(s) Oral daily  atorvastatin 80 milliGRAM(s) Oral at bedtime  budesonide  80 MICROgram(s)/formoterol 4.5 MICROgram(s) Inhaler 2 Puff(s) Inhalation two times a day  heparin   Injectable 5000 Unit(s) SubCutaneous every 8 hours  hydrochlorothiazide 12.5 milliGRAM(s) Oral daily  influenza   Vaccine 0.5 milliLiter(s) IntraMuscular once  isosorbide   dinitrate Tablet (ISORDIL) 5 milliGRAM(s) Oral three times a day  losartan 100 milliGRAM(s) Oral daily  metoprolol tartrate 50 milliGRAM(s) Oral two times a day  sodium chloride 0.9%. 1000 milliLiter(s) (75 mL/Hr) IV Continuous <Continuous>  tamsulosin 0.4 milliGRAM(s) Oral at bedtime    MEDICATIONS  (PRN):  melatonin 3 milliGRAM(s) Oral at bedtime PRN Insomnia      Vital Signs Last 24 Hrs  T(C): 36.4 (07 Oct 2020 11:40), Max: 37.1 (07 Oct 2020 07:11)  T(F): 97.6 (07 Oct 2020 11:40), Max: 98.7 (07 Oct 2020 07:11)  HR: 66 (07 Oct 2020 13:10) (54 - 82)  BP: 172/96 (07 Oct 2020 13:10) (107/61 - 185/94)  BP(mean): --  RR: 17 (07 Oct 2020 13:10) (17 - 20)  SpO2: 98% (07 Oct 2020 13:10) (95% - 100%)  CAPILLARY BLOOD GLUCOSE        I&O's Summary    07 Oct 2020 07:01  -  07 Oct 2020 13:30  --------------------------------------------------------  IN: 120 mL / OUT: 300 mL / NET: -180 mL          PHYSICAL EXAM  GENERAL: NAD, well-developed  HEAD:  Atraumatic, Normocephalic  EYES: EOMI, PERRLA, conjunctiva and sclera clear  CHEST/LUNG: Clear to auscultation bilaterally; No wheeze  HEART: Regular rate and rhythm; No murmurs, rubs, or gallops  ABDOMEN: Soft, Nontender, Nondistended; Bowel sounds present  EXTREMITIES:  2+ Peripheral Pulses, No clubbing, cyanosis, or edema; R wrist pressure device in place  PSYCH: AAOx3      LABS:                        13.4   4.68  )-----------( 141      ( 07 Oct 2020 06:05 )             41.5     10-07    139  |  103  |  14  ----------------------------<  92  3.9   |  27  |  1.58<H>    Ca    9.5      07 Oct 2020 06:05  Phos  3.9     10-07  Mg     2.0     10-07    TPro  7.7  /  Alb  4.3  /  TBili  1.0  /  DBili  x   /  AST  21  /  ALT  17  /  AlkPhos  87  10-06    PT/INR - ( 07 Oct 2020 06:05 )   PT: 12.5 sec;   INR: 1.04 ratio         PTT - ( 07 Oct 2020 06:05 )  PTT:69.4 sec            RADIOLOGY & ADDITIONAL TESTS:    Imaging Personally Reviewed:  Consultant(s) Notes Reviewed:    Care Discussed with Consultants/Other Providers:

## 2020-10-07 NOTE — CONSULT NOTE ADULT - SUBJECTIVE AND OBJECTIVE BOX
Patient is a 75y old  Male who presents with a chief complaint of 75M presents per outpatient cardiology for evaluation of chest pain (07 Oct 2020 14:46)      HPI:  75M w/ CAD s/p stents, HTN, HLD, former smoker (quit 30 yr ago, 20pack year) enlarged prostate presents to [facility] for evaluation of chest pain per outpatient cardiology. Patient reports that he has been experiencing intermittent chest pain on exertion over the last 6 months. The pain has been progressive midsternal, "choking" character, 5-6/10 severity when ocurrsno reported radiation, associated w/ LEMON, associated with abdominal swelling and b/l lower extremity swelling, improves with rest. He reports that his exercise tolerance has been worsening and when attempting to walk one block will have to stop 3 to 4 times because of the discomfort. There is no CP, palpitations or sob at rest. He reports that he can lie flat at night time however there are times when he will awakening with sob which improves by sitting upright. Reports chronic cough for years with minimal sputum production which has not changed in character or frequency in the last year. No reported fever, chills, n/v/d, painful urination or change in urinary. Family history significant for MI in parents and siblings some occurring at early age in 30's-40's.    Per ED chart, patient received ASA 162mg x1 by EMS. Also demonstrated depression deemed to not have active SI/HI. Patient reports sadness that he will not get better but denies on medicine admit active SI/HI.    In the ED, VS 98.4, 184/90, 75, 18 98%RA (06 Oct 2020 21:10)    Today, he is S/P PCI to LAD  Reported CP after the procedure, being monitored    PAST MEDICAL & SURGICAL HISTORY:  Myocardial Infarction    Coronary Artery Disease  treated medically    Hyperlipemia    Hypertension    History of Hemorrhoidectomy        Review of Systems:   CONSTITUTIONAL: No fever, weight loss, or fatigue  EYES: No eye pain, visual disturbances, or discharge  ENMT:  No difficulty hearing, tinnitus, vertigo; No sinus or throat pain  NECK: No pain or stiffness  BREASTS: No pain, masses, or nipple discharge  RESPIRATORY: No cough, wheezing, chills or hemoptysis; No shortness of breath  CARDIOVASCULAR: No palpitations, dizziness, or leg swelling  GASTROINTESTINAL: No abdominal or epigastric pain. No nausea, vomiting, or hematemesis; No diarrhea or constipation. No melena or hematochezia.  GENITOURINARY: No dysuria, frequency, hematuria, or incontinence  NEUROLOGICAL: No headaches, memory loss, loss of strength, numbness, or tremors  SKIN: No itching, burning, rashes, or lesions   LYMPH NODES: No enlarged glands  ENDOCRINE: No heat or cold intolerance; No hair loss  MUSCULOSKELETAL: No joint pain or swelling; No muscle, back, or extremity pain  PSYCHIATRIC: No depression, anxiety, mood swings, or difficulty sleeping  HEME/LYMPH: No easy bruising, or bleeding gums  ALLERY AND IMMUNOLOGIC: No hives or eczema    Allergies    penicillin (Rash)  penicillins (Rash)    Intolerances        Social History:     FAMILY HISTORY:  FH: heart attack        MEDICATIONS  (STANDING):  aspirin enteric coated 81 milliGRAM(s) Oral daily  atorvastatin 80 milliGRAM(s) Oral at bedtime  budesonide  80 MICROgram(s)/formoterol 4.5 MICROgram(s) Inhaler 2 Puff(s) Inhalation two times a day  heparin   Injectable 5000 Unit(s) SubCutaneous every 8 hours  hydrochlorothiazide 12.5 milliGRAM(s) Oral daily  influenza   Vaccine 0.5 milliLiter(s) IntraMuscular once  isosorbide   dinitrate Tablet (ISORDIL) 5 milliGRAM(s) Oral three times a day  losartan 100 milliGRAM(s) Oral daily  metoprolol tartrate 50 milliGRAM(s) Oral two times a day  sodium chloride 0.9%. 1000 milliLiter(s) (75 mL/Hr) IV Continuous <Continuous>  tamsulosin 0.4 milliGRAM(s) Oral at bedtime    MEDICATIONS  (PRN):  melatonin 3 milliGRAM(s) Oral at bedtime PRN Insomnia        CAPILLARY BLOOD GLUCOSE        I&O's Summary    07 Oct 2020 07:01  -  07 Oct 2020 19:48  --------------------------------------------------------  IN: 120 mL / OUT: 500 mL / NET: -380 mL        PHYSICAL EXAM:  Vital Signs Last 24 Hrs  T(C): 36.4 (07 Oct 2020 11:40), Max: 37.1 (07 Oct 2020 07:11)  T(F): 97.6 (07 Oct 2020 11:40), Max: 98.7 (07 Oct 2020 07:11)  HR: 58 (07 Oct 2020 18:40) (54 - 82)  BP: 138/72 (07 Oct 2020 18:40) (107/61 - 190/91)  BP(mean): --  RR: 16 (07 Oct 2020 18:40) (16 - 20)  SpO2: 97% (07 Oct 2020 18:40) (95% - 99%)    GENERAL: NAD, well-developed  HEAD:  Atraumatic, Normocephalic  EYES: EOMI, PERRLA, conjunctiva and sclera clear  NECK: Supple, No JVD  CHEST/LUNG: Clear to auscultation bilaterally; No wheeze  HEART: Regular rate and rhythm; No murmurs, rubs, or gallops  ABDOMEN: Soft, Nontender, Nondistended; Bowel sounds present  EXTREMITIES:  2+ Peripheral Pulses, No clubbing, cyanosis, or edema  PSYCH: AAOx3  NEUROLOGY: non-focal  SKIN: No rashes or lesions    LABS:                        13.4   4.68  )-----------( 141      ( 07 Oct 2020 06:05 )             41.5     10-07    139  |  103  |  14  ----------------------------<  92  3.9   |  27  |  1.58<H>    Ca    9.5      07 Oct 2020 06:05  Phos  3.9     10-07  Mg     2.0     10-07    TPro  7.7  /  Alb  4.3  /  TBili  1.0  /  DBili  x   /  AST  21  /  ALT  17  /  AlkPhos  87  10-06    PT/INR - ( 07 Oct 2020 06:05 )   PT: 12.5 sec;   INR: 1.04 ratio         PTT - ( 07 Oct 2020 06:05 )  PTT:69.4 sec          RADIOLOGY & ADDITIONAL TESTS:    Imaging Personally Reviewed:    Consultant(s) Notes Reviewed:      Care Discussed with Consultants/Other Providers:

## 2020-10-07 NOTE — CHART NOTE - NSCHARTNOTEFT_GEN_A_CORE
patient c/o left sided chest pain 7/10 radiating to L back, ECG done with changes in inferior leads and V2-3. Dr. Bourne aware, will administed nitro and reassess after 15 min. patient c/o left sided chest pain 7/10 radiating to L back, ECG done with changes in  V2-3. Dr. Bourne aware, will administer nitro and reassess ECG after 15 min of nitro administration.

## 2020-10-07 NOTE — DISCHARGE NOTE PROVIDER - HOSPITAL COURSE
HPI:  75M w/ CAD s/p stents, HTN, HLD, former smoker (quit 30 yr ago, 20pack year) enlarged prostate presents to [facility] for evaluation of chest pain per outpatient cardiology. Patient reports that he has been experiencing intermittent chest pain on exertion over the last 6 months. The pain has been progressive midsternal, "choking" character, 5-6/10 severity when ocurrsno reported radiation, associated w/ LEMON, associated with abdominal swelling and b/l lower extremity swelling, improves with rest. He reports that his exercise tolerance has been worsening and when attempting to walk one block will have to stop 3 to 4 times because of the discomfort. There is no CP, palpitations or sob at rest. He reports that he can lie flat at night time however there are times when he will awakening with sob which improves by sitting upright. Reports chronic cough for years with minimal sputum production which has not changed in character or frequency in the last year. No reported fever, chills, n/v/d, painful urination or change in urinary. Family history significant for MI in parents and siblings some occurring at early age in 30's-40's.    Per ED chart, patient received ASA 162mg x1 by EMS. Also demonstrated depression deemed to not have active SI/HI. Patient reports sadness that he will not get better but denies on medicine admit active SI/HI.    In the ED, VS 98.4, 184/90, 75, 18 98%RA (06 Oct 2020 21:10)    10/7 cardiac cath with 2 stents to the LAD. Right radial site without swelling, bleeding. HPI:  75M w/ CAD s/p stents, HTN, HLD, former smoker (quit 30 yr ago, 20pack year) enlarged prostate presents to [facility] for evaluation of chest pain per outpatient cardiology. Patient reports that he has been experiencing intermittent chest pain on exertion over the last 6 months. The pain has been progressive midsternal, "choking" character, 5-6/10 severity when ocurrsno reported radiation, associated w/ LEMON, associated with abdominal swelling and b/l lower extremity swelling, improves with rest. He reports that his exercise tolerance has been worsening and when attempting to walk one block will have to stop 3 to 4 times because of the discomfort. There is no CP, palpitations or sob at rest. He reports that he can lie flat at night time however there are times when he will awakening with sob which improves by sitting upright. Reports chronic cough for years with minimal sputum production which has not changed in character or frequency in the last year. No reported fever, chills, n/v/d, painful urination or change in urinary. Family history significant for MI in parents and siblings some occurring at early age in 30's-40's.    Per ED chart, patient received ASA 162mg x1 by EMS. Also demonstrated depression deemed to not have active SI/HI. Patient reports sadness that he will not get better but denies on medicine admit active SI/HI.    In the ED, VS 98.4, 184/90, 75, 18 98%RA (06 Oct 2020 21:10)    10/7 cardiac cath with 2 stents to the LAD. Right radial site without swelling, bleeding.     10/8 stable for discharge on ASA/Plavix with outpatient cardiology follow up. 35 minutes spent discharging the patient

## 2020-10-07 NOTE — CHART NOTE - NSCHARTNOTEFT_GEN_A_CORE
Post Procedure Chest Pain    Pt continue to complain of chest pain post procedure: 6/10 repeat EKG after NTG transdermal reveals SR @ 63 bpm, TW normalized inferiorly, ST V1 down to 1/2 mm from about 1 mm, V2 down to .5 mm from about 1.5mm. Pt sbp 149/79 (decreased). Will continue to monitor closely.    Santosh Carpenter, NP   x [phone]

## 2020-10-07 NOTE — PROGRESS NOTE ADULT - PROBLEM SELECTOR PLAN 1
Cont asa 81d, statin, metoprolol, ARB(losartan while inpatient), isodril  - tele monitoring. troponin trend overnight. JONNY x 2 to LAD  Cont ASA/Plavix

## 2020-10-07 NOTE — DISCHARGE NOTE PROVIDER - NSDCMRMEDTOKEN_GEN_ALL_CORE_FT
Aspirin Enteric Coated 81 mg oral delayed release tablet: 1 tab(s) orally once a day  isosorbide dinitrate 5 mg oral tablet: 1 tab(s) orally once a day  Metoprolol Tartrate 50 mg oral tablet: 1 tab(s) orally 2 times a day  tamsulosin 0.4 mg oral capsule: 1 cap(s) orally once a day  valsartan-hydrochlorothiazide 320mg-12.5mg oral tablet: 1 tab(s) orally once a day   Aspirin Enteric Coated 81 mg oral delayed release tablet: 1 tab(s) orally once a day  atorvastatin 80 mg oral tablet: 1 tab(s) orally once a day (at bedtime)  clopidogrel 75 mg oral tablet: 1 tab(s) orally once a day  isosorbide dinitrate 5 mg oral tablet: 1 tab(s) orally once a day  Metoprolol Tartrate 50 mg oral tablet: 1 tab(s) orally 2 times a day  tamsulosin 0.4 mg oral capsule: 1 cap(s) orally once a day  valsartan-hydrochlorothiazide 320mg-12.5mg oral tablet: 1 tab(s) orally once a day

## 2020-10-07 NOTE — DISCHARGE NOTE PROVIDER - NSDCFUADDINST_GEN_ALL_CORE_FT
Wound Care:   the day AFTER your procedure remove bandage GENTTLY, and clean using  mild soap and gentle warm, water stream, pat dry. leave OPEN to air. YOU MAY SHOWER   DO NOT apply lotions, creams, ointments, powder, parfumes to your incision site  DO NOT SOAK your site for 1 week ( no baths, no pools, no tubs, etc...)  Check  your groin and /or wirst daily.A small amount of bruising, and soarness are normal    ACTIVITY: for 24 hours   - DO NOT DRIVE  - DO NOT make any important decisions or sign legal documents   - DO NOT operate heavy machinaries   - you may resume sexual activity in 48 hours, unless otherwise instructed by your cardiologist     If your procedure was done through the WRIST: for the NEXT 3DAYS:  - avoid pushing, pulling, with that affected wrist   - avoid repeated movement of that hand and wrist ( eg: typing, hammering)  - DO NOT LIFT anything more than 5 lbs     If your procedure was done through the GROIN: for the NEXT 5 DAYS  - Limit climbing stairs, DO NOT soak in bathtub or pool  - no strenous activities, pushing, pulling, straining  - Do not lift anything 10lbs or heavier     MEDICATION:   take your medications as explained ( see discharge paperwork)   If you received a STENT, you will be taking antiplatelet medications to KEEP YOUR STENT OPEN ( eg: Aspirin, Plavix, Brilinta, Effient, etc).  Take as prescribed DO NOT STOP taking them without consulting with your cardiologist first.     Follow heart healthy diet reccomended by your doctor, , if you smoke STOP SMOKING ( may call [phone] for center of tobacco control if you need assistance)     CALL your doctor to make appointment in 2 WEEKS     ***CALL YOUR DOCTOR***  if you experience: fever, chills, body aches, or severe pain, swelling, redness, heat or yellow discharge at incision site  If you experience Bleeding or excruciating pain at the procedural site, sweliing ( golf ball size) at your procedural site  If you experience CHEST PAIN  If you experience extremity numbness, tingling, temperature change ( of your procedural site)   If you are unable to reach your doctor, you may contact:   -Cardiology Office at [facility] at [phone] or   - [facility] [phone] - [facility] [phone]

## 2020-10-07 NOTE — PROGRESS NOTE ADULT - PROBLEM SELECTOR PLAN 2
suspect patient has COPD given history of smoking and chronic cough w/ mild mucous production  - pending cardiac w/u would likely benefit from CT chest following cards w/u.  - empirically will start symbicort  - CXR does not demonstrate acute pathology, and no overt acute heart failure on medicine admit exam Suspect patient has COPD  Empirically started on Symbicort  Recommend outpatient PFTs

## 2020-10-07 NOTE — PROGRESS NOTE ADULT - ASSESSMENT
75 M with a history of CAD s/p PCI over 10 years ago at [facility] (no records), HTN, HLD and prior smoker, presented with chest pain, s/p LHC with JONNY to mLAD 80% stenosis.    #CAD s/p PCI 10/07  -- LHC 10/07 with mLAD 80% stenosis, LM 30% stenosis, LCx 30% stenosis, OM1 80% stenosis, s/p JONNY to mLAD stenosis, OM1 was small vessel and ostial, no PCI was done  -- DAPT with ASA and plavix for 1 year  -- TTE EF 65%, no WMA  -- Lipitor 80mg qhs  -- C/w metop tartrate 50 BID and losartan 100 daily  -- Will need close cardiology f/u as outpatient    Attending of record: Dr. Herring    Signed by  Elroy Scruggs MD  PGY4 Cardiology 75 M with a history of CAD s/p PCI over 10 years ago at [facility] (no records), HTN, HLD and prior smoker.  Presented with chest pain, now s/p [facility] with JONNY to mLAD 80% stenosis.    REC:  #CAD s/p PCI 10/07 -   -- [facility] 10/07 with mLAD 80% stenosis, LM 30% stenosis, LCx 30% stenosis, OM1 80% stenosis, s/p JONNY to mLAD stenosis, OM1 was small vessel and ostial, no PCI was done  -- DAPT with ASA and Plavix for 1 year  -- TTE EF 65%, no WMA  -- Lipitor 80mg qhs  -- C/w metop tartrate 50 BID and losartan 100 daily  -- Will need close cardiology f/u as outpatient      Signed by  Elroy Scruggs MD  PGY4 Cardiology    Humberto Herring M.D.  Cardiology Attending, Consult Service    All Cardiology service information can be found 24/7 on [email or web address], password: [email or web address]

## 2020-10-07 NOTE — PROGRESS NOTE ADULT - SUBJECTIVE AND OBJECTIVE BOX
Cardiology Fellow Consult Progress Note    Subjective:    S/p C today with JONNY to mLAD 80% lesion. No shawn-procedural complication.     Physical Exam:  T(F): 97.6 (10-07), Max: 98.7 (10-07)  HR: 64 (10-07) (54 - 82)  BP: 174/91 (10-07) (107/61 - 190/91)  RR: 18 (10-07)  SpO2: 96% (10-07)  GENERAL: No acute distress, well-developed  HEAD:  Atraumatic, Normocephalic  ENT: EOMI, PERRLA, conjunctiva and sclera clear, Neck supple, No JVD, moist mucosa  CHEST/LUNG: Clear to auscultation bilaterally; No wheeze, equal breath sounds bilaterally   BACK: No spinal tenderness  HEART: Regular rate and rhythm; No murmurs, rubs, or gallops  ABDOMEN: Soft, Nontender, Nondistended; Bowel sounds present  EXTREMITIES:  No clubbing, cyanosis, or edema  PSYCH: Nl behavior, nl affect  NEUROLOGY: AAOx3, non-focal, cranial nerves intact  SKIN: Normal color, No rashes or lesions    Cardiovascular diagnostic testings: personally reviewed    Imaging diagnostic testings: personally reviewed    Labs: Personally reviewed                        13.4   4.68  )-----------( 141      ( 07 Oct 2020 06:05 )             41.5     10-07    139  |  103  |  14  ----------------------------<  92  3.9   |  27  |  1.58<H>    Ca    9.5      07 Oct 2020 06:05  Phos  3.9     10-07  Mg     2.0     10-07    TPro  7.7  /  Alb  4.3  /  TBili  1.0  /  DBili  x   /  AST  21  /  ALT  17  /  AlkPhos  87  10-06    PT/INR - ( 07 Oct 2020 06:05 )   PT: 12.5 sec;   INR: 1.04 ratio         PTT - ( 07 Oct 2020 06:05 )  PTT:69.4 sec    CARDIAC MARKERS ( 07 Oct 2020 06:05 )  26 ng/L / x     / x     / x     / x     / x      CARDIAC MARKERS ( 07 Oct 2020 00:53 )  24 ng/L / x     / x     / x     / x     / x      CARDIAC MARKERS ( 06 Oct 2020 16:35 )  22 ng/L / x     / x     / x     / x     / x            Serum Pro-Brain Natriuretic Peptide: 247 pg/mL (10-06 @ 16:35)           Cardiology Fellow Consult Progress Note    Subjective:  S/p C today with JONNY to mLAD 80% lesion. No shawn-procedural complication.       MEDICATIONS  (STANDING):  aspirin enteric coated 81 milliGRAM(s) Oral daily  atorvastatin 80 milliGRAM(s) Oral at bedtime  budesonide  80 MICROgram(s)/formoterol 4.5 MICROgram(s) Inhaler 2 Puff(s) Inhalation two times a day  heparin   Injectable 5000 Unit(s) SubCutaneous every 8 hours  hydrochlorothiazide 12.5 milliGRAM(s) Oral daily  influenza   Vaccine 0.5 milliLiter(s) IntraMuscular once  isosorbide   dinitrate Tablet (ISORDIL) 5 milliGRAM(s) Oral three times a day  losartan 100 milliGRAM(s) Oral daily  metoprolol tartrate 50 milliGRAM(s) Oral two times a day  sodium chloride 0.9%. 1000 milliLiter(s) (75 mL/Hr) IV Continuous <Continuous>  tamsulosin 0.4 milliGRAM(s) Oral at bedtime      ROS:  Unchanged      Physical Exam:  T(F): 97.6 (10-07), Max: 98.7 (10-07)  HR: 64 (10-07) (54 - 82)  BP: 174/91 (10-07) (107/61 - 190/91)  RR: 18 (10-07)  SpO2: 96% (10-07)    GENERAL: No acute distress, well-developed  HEAD:  Atraumatic, Normocephalic  ENT: EOMI, PERRLA, conjunctiva and sclera clear, Neck supple, No JVD, moist mucosa  CHEST/LUNG: Clear to auscultation bilaterally; No wheeze, equal breath sounds bilaterally   BACK: No spinal tenderness  HEART: Regular rate and rhythm; No murmurs, rubs, or gallops  ABDOMEN: Soft, Nontender, Nondistended; Bowel sounds present  EXTREMITIES:  No clubbing, cyanosis, or edema  PSYCH: Nl behavior, nl affect  NEUROLOGY: AAOx3, non-focal, cranial nerves intact  SKIN: Normal color, No rashes or lesions      Labs:                      13.4   4.68  )-----------( 141      ( 07 Oct 2020 06:05 )             41.5     10-07  139  |  103  |  14  ----------------------------<  92  3.9   |  27  |  1.58<H>    Ca    9.5      07 Oct 2020 06:05  Phos  3.9     10-07  Mg     2.0     10-07    TPro  7.7  /  Alb  4.3  /  TBili  1.0  /  DBili  x   /  AST  21  /  ALT  17  /  AlkPhos  87  10-06    PT/INR - ( 07 Oct 2020 06:05 )   PT: 12.5 sec;   INR: 1.04 ratio    PTT - ( 07 Oct 2020 06:05 )  PTT:69.4 sec      CARDIAC MARKERS ( 07 Oct 2020 06:05 )  26 ng/L / x     / x     / x     / x     / x      CARDIAC MARKERS ( 07 Oct 2020 00:53 )  24 ng/L / x     / x     / x     / x     / x      CARDIAC MARKERS ( 06 Oct 2020 16:35 )  22 ng/L / x     / x     / x     / x     / x        Serum Pro-Brain Natriuretic Peptide: 247 pg/mL (10-06 @ 16:35)

## 2020-10-08 ENCOUNTER — TRANSCRIPTION ENCOUNTER (OUTPATIENT)
Age: 75
End: 2020-10-08

## 2020-10-08 VITALS
SYSTOLIC BLOOD PRESSURE: 156 MMHG | TEMPERATURE: 98 F | OXYGEN SATURATION: 100 % | HEART RATE: 66 BPM | RESPIRATION RATE: 18 BRPM | DIASTOLIC BLOOD PRESSURE: 84 MMHG

## 2020-10-08 LAB
ALBUMIN SERPL ELPH-MCNC: 3.7 G/DL — SIGNIFICANT CHANGE UP (ref 3.3–5)
ALP SERPL-CCNC: 79 U/L — SIGNIFICANT CHANGE UP (ref 40–120)
ALT FLD-CCNC: 16 U/L — SIGNIFICANT CHANGE UP (ref 10–45)
ANION GAP SERPL CALC-SCNC: 9 MMOL/L — SIGNIFICANT CHANGE UP (ref 5–17)
AST SERPL-CCNC: 46 U/L — HIGH (ref 10–40)
BASOPHILS # BLD AUTO: 0.02 K/UL — SIGNIFICANT CHANGE UP (ref 0–0.2)
BASOPHILS NFR BLD AUTO: 0.2 % — SIGNIFICANT CHANGE UP (ref 0–2)
BILIRUB SERPL-MCNC: 1.1 MG/DL — SIGNIFICANT CHANGE UP (ref 0.2–1.2)
BUN SERPL-MCNC: 15 MG/DL — SIGNIFICANT CHANGE UP (ref 7–23)
CALCIUM SERPL-MCNC: 9.1 MG/DL — SIGNIFICANT CHANGE UP (ref 8.4–10.5)
CHLORIDE SERPL-SCNC: 103 MMOL/L — SIGNIFICANT CHANGE UP (ref 96–108)
CO2 SERPL-SCNC: 25 MMOL/L — SIGNIFICANT CHANGE UP (ref 22–31)
CREAT SERPL-MCNC: 1.5 MG/DL — HIGH (ref 0.5–1.3)
EOSINOPHIL # BLD AUTO: 0.11 K/UL — SIGNIFICANT CHANGE UP (ref 0–0.5)
EOSINOPHIL NFR BLD AUTO: 1.3 % — SIGNIFICANT CHANGE UP (ref 0–6)
GLUCOSE SERPL-MCNC: 104 MG/DL — HIGH (ref 70–99)
HCT VFR BLD CALC: 41.2 % — SIGNIFICANT CHANGE UP (ref 39–50)
HGB BLD-MCNC: 13.6 G/DL — SIGNIFICANT CHANGE UP (ref 13–17)
IMM GRANULOCYTES NFR BLD AUTO: 0.2 % — SIGNIFICANT CHANGE UP (ref 0–1.5)
LYMPHOCYTES # BLD AUTO: 1.2 K/UL — SIGNIFICANT CHANGE UP (ref 1–3.3)
LYMPHOCYTES # BLD AUTO: 14.2 % — SIGNIFICANT CHANGE UP (ref 13–44)
MAGNESIUM SERPL-MCNC: 2 MG/DL — SIGNIFICANT CHANGE UP (ref 1.6–2.6)
MCHC RBC-ENTMCNC: 30.2 PG — SIGNIFICANT CHANGE UP (ref 27–34)
MCHC RBC-ENTMCNC: 33 GM/DL — SIGNIFICANT CHANGE UP (ref 32–36)
MCV RBC AUTO: 91.6 FL — SIGNIFICANT CHANGE UP (ref 80–100)
MONOCYTES # BLD AUTO: 0.53 K/UL — SIGNIFICANT CHANGE UP (ref 0–0.9)
MONOCYTES NFR BLD AUTO: 6.3 % — SIGNIFICANT CHANGE UP (ref 2–14)
NEUTROPHILS # BLD AUTO: 6.55 K/UL — SIGNIFICANT CHANGE UP (ref 1.8–7.4)
NEUTROPHILS NFR BLD AUTO: 77.8 % — HIGH (ref 43–77)
NRBC # BLD: 0 /100 WBCS — SIGNIFICANT CHANGE UP (ref 0–0)
PLATELET # BLD AUTO: 142 K/UL — LOW (ref 150–400)
POTASSIUM SERPL-MCNC: 4.1 MMOL/L — SIGNIFICANT CHANGE UP (ref 3.5–5.3)
POTASSIUM SERPL-SCNC: 4.1 MMOL/L — SIGNIFICANT CHANGE UP (ref 3.5–5.3)
PROT SERPL-MCNC: 6.8 G/DL — SIGNIFICANT CHANGE UP (ref 6–8.3)
RBC # BLD: 4.5 M/UL — SIGNIFICANT CHANGE UP (ref 4.2–5.8)
RBC # FLD: 12.9 % — SIGNIFICANT CHANGE UP (ref 10.3–14.5)
SODIUM SERPL-SCNC: 137 MMOL/L — SIGNIFICANT CHANGE UP (ref 135–145)
WBC # BLD: 8.43 K/UL — SIGNIFICANT CHANGE UP (ref 3.8–10.5)
WBC # FLD AUTO: 8.43 K/UL — SIGNIFICANT CHANGE UP (ref 3.8–10.5)

## 2020-10-08 PROCEDURE — 0225U NFCT DS DNA&RNA 21 SARSCOV2: CPT

## 2020-10-08 PROCEDURE — C1887: CPT

## 2020-10-08 PROCEDURE — 86803 HEPATITIS C AB TEST: CPT

## 2020-10-08 PROCEDURE — 86769 SARS-COV-2 COVID-19 ANTIBODY: CPT

## 2020-10-08 PROCEDURE — 80053 COMPREHEN METABOLIC PANEL: CPT

## 2020-10-08 PROCEDURE — 71045 X-RAY EXAM CHEST 1 VIEW: CPT

## 2020-10-08 PROCEDURE — C8929: CPT

## 2020-10-08 PROCEDURE — 93458 L HRT ARTERY/VENTRICLE ANGIO: CPT | Mod: 59

## 2020-10-08 PROCEDURE — C1769: CPT

## 2020-10-08 PROCEDURE — 85730 THROMBOPLASTIN TIME PARTIAL: CPT

## 2020-10-08 PROCEDURE — C1725: CPT

## 2020-10-08 PROCEDURE — 99152 MOD SED SAME PHYS/QHP 5/>YRS: CPT

## 2020-10-08 PROCEDURE — 84100 ASSAY OF PHOSPHORUS: CPT

## 2020-10-08 PROCEDURE — 83880 ASSAY OF NATRIURETIC PEPTIDE: CPT

## 2020-10-08 PROCEDURE — 99233 SBSQ HOSP IP/OBS HIGH 50: CPT | Mod: GC

## 2020-10-08 PROCEDURE — 83735 ASSAY OF MAGNESIUM: CPT

## 2020-10-08 PROCEDURE — 94640 AIRWAY INHALATION TREATMENT: CPT

## 2020-10-08 PROCEDURE — 99239 HOSP IP/OBS DSCHRG MGMT >30: CPT

## 2020-10-08 PROCEDURE — 99232 SBSQ HOSP IP/OBS MODERATE 35: CPT

## 2020-10-08 PROCEDURE — 80048 BASIC METABOLIC PNL TOTAL CA: CPT

## 2020-10-08 PROCEDURE — C1894: CPT

## 2020-10-08 PROCEDURE — 93571 IV DOP VEL&/PRESS C FLO 1ST: CPT | Mod: LD

## 2020-10-08 PROCEDURE — 84484 ASSAY OF TROPONIN QUANT: CPT

## 2020-10-08 PROCEDURE — 99153 MOD SED SAME PHYS/QHP EA: CPT

## 2020-10-08 PROCEDURE — 85025 COMPLETE CBC W/AUTO DIFF WBC: CPT

## 2020-10-08 PROCEDURE — 93005 ELECTROCARDIOGRAM TRACING: CPT

## 2020-10-08 PROCEDURE — C9600: CPT | Mod: LD

## 2020-10-08 PROCEDURE — 85610 PROTHROMBIN TIME: CPT

## 2020-10-08 PROCEDURE — 99285 EMERGENCY DEPT VISIT HI MDM: CPT | Mod: 25

## 2020-10-08 PROCEDURE — 93010 ELECTROCARDIOGRAM REPORT: CPT

## 2020-10-08 PROCEDURE — C1874: CPT

## 2020-10-08 PROCEDURE — 85027 COMPLETE CBC AUTOMATED: CPT

## 2020-10-08 PROCEDURE — 90686 IIV4 VACC NO PRSV 0.5 ML IM: CPT

## 2020-10-08 RX ORDER — ATORVASTATIN CALCIUM 80 MG/1
1 TABLET, FILM COATED ORAL
Qty: 30 | Refills: 0
Start: 2020-10-08 | End: 2020-11-06

## 2020-10-08 RX ORDER — CLOPIDOGREL BISULFATE 75 MG/1
1 TABLET, FILM COATED ORAL
Qty: 90 | Refills: 3
Start: 2020-10-08 | End: 2021-10-02

## 2020-10-08 RX ADMIN — INFLUENZA VIRUS VACCINE 0.5 MILLILITER(S): 15; 15; 15; 15 SUSPENSION INTRAMUSCULAR at 10:52

## 2020-10-08 RX ADMIN — BUDESONIDE AND FORMOTEROL FUMARATE DIHYDRATE 2 PUFF(S): 160; 4.5 AEROSOL RESPIRATORY (INHALATION) at 05:34

## 2020-10-08 RX ADMIN — Medication 12.5 MILLIGRAM(S): at 05:30

## 2020-10-08 RX ADMIN — CLOPIDOGREL BISULFATE 75 MILLIGRAM(S): 75 TABLET, FILM COATED ORAL at 05:31

## 2020-10-08 RX ADMIN — HEPARIN SODIUM 5000 UNIT(S): 5000 INJECTION INTRAVENOUS; SUBCUTANEOUS at 05:32

## 2020-10-08 RX ADMIN — LOSARTAN POTASSIUM 100 MILLIGRAM(S): 100 TABLET, FILM COATED ORAL at 05:32

## 2020-10-08 RX ADMIN — Medication 50 MILLIGRAM(S): at 11:06

## 2020-10-08 RX ADMIN — ISOSORBIDE DINITRATE 5 MILLIGRAM(S): 5 TABLET ORAL at 05:31

## 2020-10-08 RX ADMIN — Medication 81 MILLIGRAM(S): at 05:31

## 2020-10-08 NOTE — DISCHARGE NOTE NURSING/CASE MANAGEMENT/SOCIAL WORK - PATIENT PORTAL LINK FT
You can access the FollowMyHealth Patient Portal offered by [facility] by registering at the following website: [email or web address]. By joining [employer]’s FollowMyHealth portal, you will also be able to view your health information using other applications (apps) compatible with our system.
Father  Still living? No  Family history of colon cancer, Age at diagnosis: Age Unknown  Family history of heart attack, Age at diagnosis: Age Unknown     Mother  Still living? Yes, Estimated age: 81-90  Family history of dementia, Age at diagnosis: Age Unknown     Sibling  Still living? Yes, Estimated age: Age Unknown  Family history of mental disorder in sister, Age at diagnosis: Age Unknown

## 2020-10-08 NOTE — CHART NOTE - NSCHARTNOTEFT_GEN_A_CORE
75 M with a history of CAD s/p PCI over 10 years ago at [facility] (no records), HTN, HLD and prior smoker.  Presented with chest pain,  now S/p [facility] yesterday 10/7  with DESx2 to LAD lesion. No shawn-procedural complication. RRA site stable w/o bleeding or hematoma, soft non tender.     Called at bed side for c/c of " chest pain".  patient reports CP started and has NOT resolved since stents placement yesterday.  CP intermittent, rated 6/10, located on left chest wall, occasionally radiating to mid back.  Stat ECG performed does not reveal any significant ST or T wave findings.  Trops neg x3 ( see below). vitals remain stable /78 HR 81 RR 16/min, o2 sat 99% on room air, no events reported on tele last night or this am; remains in SR with occasional PVCs.  Dr Bourne at bedside seen and examined pt, states  CP most likely 2/2 stretching of vessels after stent placement and will resolve with time, no objection to discharge today.     CARDIAC MARKERS ( 07 Oct 2020 06:05 )  26 ng/L / x     / x     / x     / x     / x      CARDIAC MARKERS ( 07 Oct 2020 00:53 )  24 ng/L / x     / x     / x     / x     / x      CARDIAC MARKERS ( 06 Oct 2020 16:35 )  22 ng/L / x     / x     / x     / x     / x        -- cont DAPT with ASA and Plavix for 1 year  -- TTE EF 65%, no WMA  -- Lipitor 80mg qhs  -- C/w metop tartrate 50 BID, Losartan 100 daily, HCTZ and Isosorbide  -- cardiology f/u as outpatient in 1-2 weeks       Tahir Guadalupe NP  [facility] ext [phone]

## 2020-10-08 NOTE — PROGRESS NOTE ADULT - SUBJECTIVE AND OBJECTIVE BOX
History:  75M with a history of CAD s/p PCI over 10 years ago at [facility] (stent mLAD), HTN, HLD and prior smoker. Presented from his cardiologist's office (Dr. Eloy Castellano) for cardiac cath witrh 4/10 CP and exertional dyspnea. Patient has had 6-8 months of worsening central sharp chest pain without radiation. Of note, he has gained 10-20 pounds around this time.  Per report from his cardiologist, he had a recent echo and stress test, both of which were unremarkable.   [facility] performed yesterday (10/7) with JONNY to mLAD. No shawn-procedural complications.     Interval history:  Overnight events: patient has continued to report chest pain since JONNY placement yesterday. Patient received nitro yesterday at 14:18 with no relief. There were no tele-events overnight and EKG has been consistently sinus rhythm with occasional PVCs through this morning.     Patient was seen and examined at bedside this AM. Patient reports continued 6/10 chest pain since yesterday. He describes the pain as originally midsternal then "moving" to his left anterior chest with intermittent posterior radiating pain. Patient endorses a wet/dry cough that is unchanged from baseline. Denies SOB and palpitations.     REVIEW OF SYSTEMS:  CONSTITUTIONAL: No weakness, fevers or chills  EYES/ENT: No visual changes;  No dysphagia  NECK: No pain or stiffness  RESPIRATORY: wet/dry cough; no wheezing or hemoptysis; No shortness of breath  CARDIOVASCULAR: 6/10 left anterior chest pain with posterior radiation; no palpitations; complains of lower extremity edema  GASTROINTESTINAL: No abdominal or epigastric pain. No nausea, vomiting, or hematemesis; No diarrhea or constipation. No melena or hematochezia.  GENITOURINARY: No dysuria, frequency or hematuria  NEUROLOGICAL: No numbness or weakness  SKIN: No itching, burning, rashes, or lesions   All other review of systems is negative unless indicated above.    Current Medications:  aspirin enteric coated 81 milliGRAM(s) Oral daily  atorvastatin 80 milliGRAM(s) Oral at bedtime  budesonide  80 MICROgram(s)/formoterol 4.5 MICROgram(s) Inhaler 2 Puff(s) Inhalation two times a day  clopidogrel Tablet 75 milliGRAM(s) Oral daily  heparin   Injectable 5000 Unit(s) SubCutaneous every 8 hours  hydrochlorothiazide 12.5 milliGRAM(s) Oral daily  influenza   Vaccine 0.5 milliLiter(s) IntraMuscular once  isosorbide   dinitrate Tablet (ISORDIL) 5 milliGRAM(s) Oral three times a day  losartan 100 milliGRAM(s) Oral daily  metoprolol tartrate 50 milliGRAM(s) Oral two times a day  sodium chloride 0.9%. 1000 milliLiter(s) IV Continuous <Continuous>  tamsulosin 0.4 milliGRAM(s) Oral at bedtime    Medications (PRN):  melatonin 3 milliGRAM(s) Oral at bedtime PRN Insomnia    PAST MEDICAL & SURGICAL HISTORY:  Myocardial Infarction  Coronary Artery Disease - s/p PCI over 10 years ago at [facility] (stent mLAD), s/p JONNY placement on 10/7  Hyperlipemia  Hypertension  History of Hemorrhoidectomy    FAMILY HISTORY:  FH: heart attack    Allergies:  penicillin (Rash)  penicillins (Rash)    Vitals:  T(F): 98.4 (10-08), Max: 98.4 (10-08)  HR: 81 (10-08) (58 - 81)  BP: 116/78 (10-08) (116/78 - 190/91)  RR: 18 (10-08)  SpO2: 98% (10-08)  BMI (kg/m2): 26.9 (10-07-20 @ 07:11)  BSA (m2): 2.03 (10-07-20 @ 07:11)    Physical Exam:  GENERAL: No acute distress, well-developed  HEAD:  Atraumatic, Normocephalic  ENT: EOMI, PERRLA, conjunctiva and sclera clear, Neck supple, No JVD, moist mucosa  CHEST/LUNG: Clear to auscultation bilaterally; No wheeze,   HEART: Regular rate and rhythm; No murmurs, rubs, or gallops  ABDOMEN: Soft, Nontender, Nondistended; Bowel sounds present  EXTREMITIES:  No clubbing or cyanosis; no peripheral edema noted  PSYCH: Nl behavior, nl affect  NEUROLOGY: AAOx3, non-focal, cranial nerves intact  SKIN: Normal color, No rashes or lesions    Labs: Personally reviewed                        13.6   8.43  )-----------( 142      ( 08 Oct 2020 05:47 )             41.2     10-08    137  |  103  |  15  ----------------------------<  104<H>  4.1   |  25  |  1.50<H>    Ca    9.1      08 Oct 2020 05:47  Phos  3.9     10-07  Mg     2.0     10-08    TPro  6.8  /  Alb  3.7  /  TBili  1.1  /  DBili  x   /  AST  46<H>  /  ALT  16  /  AlkPhos  79  10-08    PT/INR - ( 07 Oct 2020 06:05 )   PT: 12.5 sec;   INR: 1.04 ratio       PTT - ( 07 Oct 2020 06:05 )  PTT:69.4 sec    CARDIAC MARKERS ( 07 Oct 2020 06:05 )  26 ng/L / x     / x     / x     / x     / x      CARDIAC MARKERS ( 07 Oct 2020 00:53 )  24 ng/L / x     / x     / x     / x     / x      CARDIAC MARKERS ( 06 Oct 2020 16:35 )  22 ng/L / x     / x     / x     / x     / x        Serum Pro-Brain Natriuretic Peptide: 247 pg/mL (10-06 @ 16:35)    eGFR if Non African American: 46 mL/min/1.73M2 <L> (10-08-20 @ 05:47)  eGFR if Non African American: 43 mL/min/1.73M2 <L> (10-07-20 @ 06:05)  eGFR if Non African American: 42 mL/min/1.73M2 <L> (10-06-20 @ 16:35)      10-07-20 @ 07:  -  10-08-20 @ 07:00  --------------------------------------------------------  IN: 120 mL / OUT: 850 mL / NET: -730 mL    10-08-20 @ 07:01  -  10-08-20 @ 09:44  --------------------------------------------------------  IN: 240 mL / OUT: 0 mL / NET: 240 mL      Cardiac Cath Lab - Adult (10.07.20 @ 10:25)  INDICATIONS: Stable angina - CCS2.  HISTORY: The patient has a history of coronary artery disease. The patient  has hypertension, oral hypoglycemic-treated diabetes, and  medication-treated dyslipidemia.  PROCEDURE:  --  Left heart catheterization.  --  Left coronary angiography.  --Right coronary angiography.  --  Diagnostic myocardial fractional flow reserve.  --  Intervention on mid LAD: drug-eluting stent.  TECHNIQUE: The risks and alternatives of the procedures and conscious  sedation were explained to the patient and informed consent was obtained.  Cardiac catheterization performed electively. Coronary intervention  performed electively.  Local anesthetic given. Right radial artery access. Left heart  catheterization. Left coronary artery angiography. The vessel was injected  utilizing a catheter. Right coronary artery angiography. The vessel was  injected utilizing a catheter. Myocardial (fractional) flow reserve.  RADIATION EXPOSURE: 22.6 min. A successful drug-eluting stent was  performed on the 80 % lesion in the mid LAD. Following intervention there  was a 1 % residual stenosis. According to the ACC/AHA classification  system, this lesion was a type C lesion. There was LANDON 3 flow before the  procedure and LANDON 3 flow after the procedure. Vessel setup was performed.  A 6FR JL3.5 LAUNCHER guiding catheter was used to intubate the vessel.  Vessel setup was performed. A KAYLIE [id number] wire was used to cross the  lesion. Balloon angioplasty was performed, using a 2.5 X 15 EUPHORA  balloon, with 1 inflations and a maximum inflation pressure of 18 brenda. A  3.00 X 26 MARIBELL drug-eluting stent was placed across the lesion and  deployed at a maximum inflation pressure of 18 brenda. Balloon angioplasty  was performed, using a 3.00 X 15 NC APEX balloon, with 2 inflations and a  maximum inflation pressure of 14 brenda. A 3.00 X 18 MARIBELL drug-eluting stent  was placed across the lesion and deployed at a maximum inflation pressure  of 22 brenda.  CONTRAST GIVEN: Omnipaque 129 ml. An IABP was not used.  MEDICATIONS GIVEN:Fentanyl, 25 mcg, IV. Midazolam, 1 mg, IV. Verapamil  (Isoptin, Calan, Covera), 2.5 mg, IA. Heparin, 3000 units, IA. Aspirin, 81  mg, PO. Clopidogrel (Plavix), 600 mg, PO.  CORONARY VESSELS: The coronary circulation is right dominant.  LM:   --  Distal left main: There was a discrete 30 % stenosis.  LAD:   --  Mid LAD: There was a 80 % stenosis. In a second lesion, there  was a tubular 10 % stenosis at the site of a prior stent.  CX:   --  Ostial circumflex: There was a 30 % stenosis.  --  OM1: There was a 80 % stenosis.  RCA:   --  RCA: The vessel was small sized.  COMPLICATIONS: There were no complications.  DIAGNOSTIC RECOMMENDATIONS: 1. Long calcified disease of the prox-mid LAD  just proximal to the prior mid LAD stent.  2. Successful PCI (JONNY x 2) of the proximal to mid LAD, with LANDON 3 flow.  3. Continue DAPT and statin.  4. OM1 is a small vessel with severe ostial disease  5. Mild-moderate disease of the distal LM into ostial LCx, continue to  medically manage.  INTERVENTIONAL RECOMMENDATIONS: 1. Long calcified disease of the prox-mid  LAD just proximal to the prior mid LAD stent.  2. Successful PCI (JONNY x 2) of the proximal to mid LAD, with LANDON 3 flow.  3. Continue DAPT and statin.  4. OM1 is a small vessel with severe ostial disease  5. Mild-moderate disease of the distal LM into ostial LCx, continue to  medically manage.  Prepared and signed by  Jaguar Bourne M.D.  Signed 10/07/2020 14:17:42  HEMODYNAMIC TABLES  Pressures:  Baseline  Pressures:  - HR: 62  Pressures:  -Rhythm:  Pressures:  -- Aortic Pressure (S/D/M): 135/74/98  Pressures:  -- Left Ventricle (s/edp): 145/22/--  Outputs:  Baseline  Outputs:  -- CALCULATIONS[age]  Outputs:  -- CALCULATIONS: Body Surface Area: 2.03  Outputs:  -- CALCULATIONS: Height in cm: 178.00  Outputs:  -- CALCULATIONS: Sex: Male  Outputs:  -- CALCULATIONS: Weight in k.80      Cardiac Cath Lab (03.16.10 @ 17:46)  Indications: Angina/MI: unstable angina. Coronary artery disease: abnormal  stress test.  History: The patient has a history of coronary artery disease. The patient  has hypertension and medication-treated hypercholesterolemia.  Procedure:  Left coronary angiography.  Right coronary angiography.  Left heart catheterization with ventriculography.  Sheath Exchange for Intervention.  Intervention on mid LAD: stent.  Technique: The risks and alternatives of the procedures and conscious  sedation were explained to the patient and informed consent was obtained.  Cardiac catheterization performed electively.  Right femoral artery access. Left coronary artery angiography. The vessel  was injected utilizing a catheter. Right coronary artery angiography. The  vessel was injected utilizing a catheter. Left heart catheterization.  Ventriculography was performed. Contrast given: 60 ml Optiray. 75 ml  Optiray. Fluoroscopy time: 6.8 min.  A successful stent was performed on the 90 % lesion in the mid LAD.  Following intervention there was a 1 % residual stenosis. There was LANDON 3  flow before the procedure and LANDON 3 flow after the procedure. Vessel  setup was performed. A 6fr JL 4.0 Zuma Guide guiding catheter was used to  intubate the vessel. Vessel setup was performed. A Spotwish 190 Wire wire  was used to cross the lesion. A Sullivan 2.50 X 14 drug-eluting stent was  placed across the lesion and deployed. A Sullivan 2.50 X 14 drug-eluting  stent was placed across the lesion and deployed. Balloon angioplasty was   performed, with 1 inflations. Sheath Exchange for Intervention.  Medications given: Midazolam, 1 mg, IV. Fentanyl, 25 mcg, IV. Bivalirudin  (Angiomax), 10.5 ml, IV. Bivalirudin (Angiomax), infusion rate of  mg/kg/hr, IV. Lidocaine 1 percent, SC. Benadryl, 25 mg, IV.  Ventricles: EF estimated by contrast ventriculography was 50 %.  Coronary vessels: The coronary circulation is right dominant.  LM:      LM: Angiography showed minor luminal irregularities with no flow  limiting lesions.  LAD:      Mid LAD: There was a 90 % stenosis.  CX:      Circumflex: Angiography showed minor luminal irregularities with  no flow limiting lesions.  RCA:      RCA: Angiography showed minor luminal irregularities with no flow  limiting lesions.  Complications: There were no complications.  Recommendations:  ASA and Plavix for 1 year  Prepared and signed by  Jaguar Bourne M.D.  Signed 2010 09:52:54  Hemodynamic tables  Pressures:  Baseline/ Room Air  Pressures:  - HR: 102  Pressures:  - Rhythm:  Pressures:  -- Aortic Pressure (S/D/M): 167/87/123  Pressures:  -- Left Ventricle (s/edp): 168/22/--  Pressures:  Intervention  Pressures:  - HR: 82  Pressures:  - Rhythm:  Pressures:  -- Aortic Pressure (S/D/M): 149/78/111  Pressures:  Post Angio  Pressures:  - HR: 61  Pressures:  - Rhythm:  Pressures:  -- Aortic Pressure (S/D/M): 184/87/58  Pressures:  -- Left Ventricle (s/edp): 177/23/--  Outputs:  Baseline/ Room Air  Outputs:  -- CALCULATIONS[age]  Outputs:  -- CALCULATIONS: Body Surface Area: 1.85  Outputs:  -- CALCULATIONS: Height in cm: 178.00  Outputs:  -- CALCULATIONS: Sex: Male  Outputs:  -- CALCULATIONS: Weight in k.00  Outputs:  -- OUTPUTS: O2 consumption: 231.02  Outputs:  -- OUTPUTS: Vo2 Indexed: 125.00  Outputs:  Intervention  Outputs:  -- OUTPUTS: O2 consumption: 231.02  Outputs:  -- OUTPUTS: Vo2 Indexed: 125.00  Outputs:  Post Angio  Outputs:  -- OUTPUTS: O2 consumption: 231.02  Outputs:  -- OUTPUTS: Vo2 Indexed: 125.00      TTE with Doppler (w/Cont) (10.07.20 @ 07:32)  LA:     3.9    2.0 - 4.0 cm  Ao:     3.2    2.0 - 3.8 cm  SEPTUM: 1.2    0.6 - 1.2 cm  PWT:    1.1    0.6 - 1.1 cm  LVIDd:  4.5    3.0 - 5.6 cm  LVIDs:  3.3    1.8 - 4.0 cm  Derived variables:  LVMI: 96 g/m2  RWT: 0.48  Fractional short: 27 %  EF (Visual Estimate): 65-70 %  Doppler Peak Velocity (m/sec): AoV=1.3  ------------------------------------------------------------------------  Observations:  Mitral Valve: Mitral annular calcification, otherwise  normal mitral valve. Minimal mitral regurgitation.  Aortic Valve/Aorta: Calcified trileaflet aortic valve with  normal opening. Peak transaortic valve gradient equals 7 mm  Hg,mean transaortic valve gradient equals 4 mm Hg, aortic  valve velocity time integral equals 30 cm. No aortic valve  regurgitation seen. Peak left ventricular outflow tract  gradient equals 2 mm Hg, mean gradient is equal to 1 mm Hg,  LVOT velocity time integral equals 21 cm.  Aortic Root: 3.2 cm.  Left Atrium: Left atrium not well visualized, probably  normal.  Left Ventricle: Endocardial visualization enhanced with  intravenous injection of Ultrasonic Enhancing Agent  (Definity).  Normal left ventricular systolic function. No  segmental wall motion abnormalities. Increased relative  wall thickness with normal left ventricular mass index,  consistent with concentric left ventricular remodeling.  Indeterminate diastolic function.  Right Heart:Right atrium not well visualized, probably  normal. The right ventricle is not well visualized; grossly  normal right ventricular systolic function. Normal  tricuspid valve. Mild tricuspid regurgitation. Pulmonic  valve not well visualized. No pulmonic regurgitation.  Pericardium/Pleura: Normal pericardium with no pericardial  effusion.  Hemodynamic: Estimated right atrial pressure is 8 mm Hg.  Estimated right ventricular systolic pressure equals 33 mm  Hg, assuming right atrial pressure equals 8mm Hg,  consistent with normal pulmonary pressures.  ------------------------------------------------------------------------  Conclusions:  1. Mitral annular calcification, otherwise normal mitral  valve. Minimal mitral regurgitation.  2. Calcified trileaflet aortic valve with normal opening.  No aortic valve regurgitation seen.  3. Endocardial visualization enhanced with intravenous  injection of Ultrasonic Enhancing Agent (Definity).  Normal  left ventricular systolic function. No segmental wall  motion abnormalities.  4. The right ventricle is not well visualized; grossly  normal right ventricular systolic function.  *** No previous Echo exam.    < end of copied text >   History:  75M with a history of CAD s/p PCI over 10 years ago at [facility] (stent mLAD), HTN, HLD and prior smoker. Presented from his cardiologist's office (Dr. Eloy Castellano) for cardiac cath witrh 4/10 CP and exertional dyspnea. Patient has had 6-8 months of worsening central sharp chest pain without radiation. Of note, he has gained 10-20 pounds around this time.  Per report from his cardiologist, he had a recent echo and stress test, both of which were unremarkable.   [facility] performed yesterday (10/7) with JONNY to mLAD. No shawn-procedural complications.       Interval history:  Overnight events: patient has continued to report chest pain since JONNY placement yesterday. Patient received nitro yesterday at 14:18 with no relief. There were no tele-events overnight and EKG has been consistently sinus rhythm with occasional PVCs through this morning.       Patient was seen and examined at bedside this AM. Patient reports continued 6/10 chest pain since yesterday. He describes the pain as originally midsternal then "moving" to his left anterior chest with intermittent posterior radiating pain. Patient endorses a wet/dry cough that is unchanged from baseline. Denies SOB and palpitations.     REVIEW OF SYSTEMS:  CONSTITUTIONAL: No weakness, fevers or chills  EYES/ENT: No visual changes;  No dysphagia  NECK: No pain or stiffness  RESPIRATORY: wet/dry cough; no wheezing or hemoptysis; No shortness of breath  CARDIOVASCULAR: 6/10 left anterior chest pain with posterior radiation; no palpitations; complains of lower extremity edema  GASTROINTESTINAL: No abdominal or epigastric pain. No nausea, vomiting, or hematemesis; No diarrhea or constipation. No melena or hematochezia.  GENITOURINARY: No dysuria, frequency or hematuria  NEUROLOGICAL: No numbness or weakness  SKIN: No itching, burning, rashes, or lesions   All other review of systems is negative unless indicated above.    Current Medications:  aspirin enteric coated 81 milliGRAM(s) Oral daily  atorvastatin 80 milliGRAM(s) Oral at bedtime  budesonide  80 MICROgram(s)/formoterol 4.5 MICROgram(s) Inhaler 2 Puff(s) Inhalation two times a day  clopidogrel Tablet 75 milliGRAM(s) Oral daily  heparin   Injectable 5000 Unit(s) SubCutaneous every 8 hours  hydrochlorothiazide 12.5 milliGRAM(s) Oral daily  influenza   Vaccine 0.5 milliLiter(s) IntraMuscular once  isosorbide   dinitrate Tablet (ISORDIL) 5 milliGRAM(s) Oral three times a day  losartan 100 milliGRAM(s) Oral daily  metoprolol tartrate 50 milliGRAM(s) Oral two times a day  sodium chloride 0.9%. 1000 milliLiter(s) IV Continuous <Continuous>  tamsulosin 0.4 milliGRAM(s) Oral at bedtime    Medications (PRN):  melatonin 3 milliGRAM(s) Oral at bedtime PRN Insomnia    PAST MEDICAL & SURGICAL HISTORY:  Myocardial Infarction  Coronary Artery Disease - s/p PCI over 10 years ago at [facility] (stent mLAD), s/p JONNY placement on 10/7  Hyperlipemia  Hypertension  History of Hemorrhoidectomy    FAMILY HISTORY:  FH: heart attack    Allergies:  penicillin (Rash)  penicillins (Rash)    Vitals:  T(F): 98.4 (10-08), Max: 98.4 (10-08)  HR: 81 (10-08) (58 - 81)  BP: 116/78 (10-08) (116/78 - 190/91)  RR: 18 (10-08)  SpO2: 98% (10-08)  BMI (kg/m2): 26.9 (10-07-20 @ 07:11)  BSA (m2): 2.03 (10-07-20 @ 07:11)    Physical Exam:  GENERAL: No acute distress, well-developed  HEAD:  Atraumatic, Normocephalic  ENT: EOMI, PERRLA, conjunctiva and sclera clear, Neck supple, No JVD, moist mucosa  CHEST/LUNG: Clear to auscultation bilaterally; No wheeze,   HEART: Regular rate and rhythm; No murmurs, rubs, or gallops  ABDOMEN: Soft, Nontender, Nondistended; Bowel sounds present  EXTREMITIES:  No clubbing or cyanosis; no peripheral edema noted  PSYCH: Nl behavior, nl affect  NEUROLOGY: AAOx3, non-focal, cranial nerves intact  SKIN: Normal color, No rashes or lesions    Labs:                       13.6   8.43  )-----------( 142      ( 08 Oct 2020 05:47 )             41.2     10-08  137  |  103  |  15  ----------------------------<  104<H>  4.1   |  25  |  1.50<H>    Ca    9.1      08 Oct 2020 05:47  Phos  3.9     10-07  Mg     2.0     10-08    TPro  6.8  /  Alb  3.7  /  TBili  1.1  /  DBili  x   /  AST  46<H>  /  ALT  16  /  AlkPhos  79  10-08    PT/INR - ( 07 Oct 2020 06:05 )   PT: 12.5 sec;   INR: 1.04 ratio    PTT - ( 07 Oct 2020 06:05 )  PTT:69.4 sec    CARDIAC MARKERS ( 07 Oct 2020 06:05 )  26 ng/L / x     / x     / x     / x     / x      CARDIAC MARKERS ( 07 Oct 2020 00:53 )  24 ng/L / x     / x     / x     / x     / x      CARDIAC MARKERS ( 06 Oct 2020 16:35 )  22 ng/L / x     / x     / x     / x     / x        Serum Pro-Brain Natriuretic Peptide: 247 pg/mL (10-06 @ 16:35)    eGFR if Non African American: 46 mL/min/1.73M2 <L> (10-08-20 @ 05:47)  eGFR if Non African American: 43 mL/min/1.73M2 <L> (10-07-20 @ 06:05)  eGFR if Non African American: 42 mL/min/1.73M2 <L> (10-06-20 @ 16:35)      10-07-20 @ 07:01  -  10-08-20 @ 07:00  --------------------------------------------------------  IN: 120 mL / OUT: 850 mL / NET: -730 mL    10-08-20 @ 07:01  -  10-08-20 @ 09:44  --------------------------------------------------------  IN: 240 mL / OUT: 0 mL / NET: 240 mL      Cardiac Cath Lab - Adult (10.07.20 @ 10:25)  INDICATIONS: Stable angina - CCS2.  HISTORY: The patient has a history of coronary artery disease. The patient  has hypertension, oral hypoglycemic-treated diabetes, and  medication-treated dyslipidemia.  PROCEDURE:  --  Left heart catheterization.  --  Left coronary angiography.  --Right coronary angiography.  --  Diagnostic myocardial fractional flow reserve.  --  Intervention on mid LAD: drug-eluting stent.  TECHNIQUE: The risks and alternatives of the procedures and conscious  sedation were explained to the patient and informed consent was obtained.  Cardiac catheterization performed electively. Coronary intervention  performed electively.  Local anesthetic given. Right radial artery access. Left heart  catheterization. Left coronary artery angiography. The vessel was injected  utilizing a catheter. Right coronary artery angiography. The vessel was  injected utilizing a catheter. Myocardial (fractional) flow reserve.  RADIATION EXPOSURE: 22.6 min. A successful drug-eluting stent was  performed on the 80 % lesion in the mid LAD. Following intervention there  was a 1 % residual stenosis. According to the ACC/AHA classification  system, this lesion was a type C lesion. There was LANDON 3 flow before the  procedure and LANDON 3 flow after the procedure. Vessel setup was performed.  A 6FR JL3.5 LAUNCHER guiding catheter was used to intubate the vessel.  Vessel setup was performed. A KAYLIE [id number] wire was used to cross the  lesion. Balloon angioplasty was performed, using a 2.5 X 15 EUPHORA  balloon, with 1 inflations and a maximum inflation pressure of 18 brenda. A  3.00 X 26 MARIBELL drug-eluting stent was placed across the lesion and  deployed at a maximum inflation pressure of 18 brenda. Balloon angioplasty  was performed, using a 3.00 X 15 NC APEX balloon, with 2 inflations and a  maximum inflation pressure of 14 brenda. A 3.00 X 18 MARIBELL drug-eluting stent  was placed across the lesion and deployed at a maximum inflation pressure  of 22 brenda.  CONTRAST GIVEN: Omnipaque 129 ml. An IABP was not used.  MEDICATIONS GIVEN:Fentanyl, 25 mcg, IV. Midazolam, 1 mg, IV. Verapamil  (Isoptin, Calan, Covera), 2.5 mg, IA. Heparin, 3000 units, IA. Aspirin, 81  mg, PO. Clopidogrel (Plavix), 600 mg, PO.  CORONARY VESSELS: The coronary circulation is right dominant.  LM:   --  Distal left main: There was a discrete 30 % stenosis.  LAD:   --  Mid LAD: There was a 80 % stenosis. In a second lesion, there  was a tubular 10 % stenosis at the site of a prior stent.  CX:   --  Ostial circumflex: There was a 30 % stenosis.  --  OM1: There was a 80 % stenosis.  RCA:   --  RCA: The vessel was small sized.  COMPLICATIONS: There were no complications.  DIAGNOSTIC RECOMMENDATIONS: 1. Long calcified disease of the prox-mid LAD  just proximal to the prior mid LAD stent.  2. Successful PCI (JONNY x 2) of the proximal to mid LAD, with LANDON 3 flow.  3. Continue DAPT and statin.  4. OM1 is a small vessel with severe ostial disease  5. Mild-moderate disease of the distal LM into ostial LCx, continue to  medically manage.  INTERVENTIONAL RECOMMENDATIONS: 1. Long calcified disease of the prox-mid  LAD just proximal to the prior mid LAD stent.  2. Successful PCI (JONNY x 2) of the proximal to mid LAD, with LANDON 3 flow.  3. Continue DAPT and statin.  4. OM1 is a small vessel with severe ostial disease  5. Mild-moderate disease of the distal LM into ostial LCx, continue to  medically manage.  Prepared and signed by  Jaguar Bourne M.D.  Signed 10/07/2020 14:17:42  HEMODYNAMIC TABLES  Pressures:  Baseline  Pressures:  - HR: 62  Pressures:  -Rhythm:  Pressures:  -- Aortic Pressure (S/D/M): 135/74/98  Pressures:  -- Left Ventricle (s/edp): 145/22/--  Outputs:  Baseline  Outputs:  -- CALCULATIONS[age]  Outputs:  -- CALCULATIONS: Body Surface Area: 2.03  Outputs:  -- CALCULATIONS: Height in cm: 178.00  Outputs:  -- CALCULATIONS: Sex: Male  Outputs:  -- CALCULATIONS: Weight in k.80      Cardiac Cath Lab (03.16.10 @ 17:46)  Indications: Angina/MI: unstable angina. Coronary artery disease: abnormal  stress test.  History: The patient has a history of coronary artery disease. The patient  has hypertension and medication-treated hypercholesterolemia.  Procedure:  Left coronary angiography.  Right coronary angiography.  Left heart catheterization with ventriculography.  Sheath Exchange for Intervention.  Intervention on mid LAD: stent.  Technique: The risks and alternatives of the procedures and conscious  sedation were explained to the patient and informed consent was obtained.  Cardiac catheterization performed electively.  Right femoral artery access. Left coronary artery angiography. The vessel  was injected utilizing a catheter. Right coronary artery angiography. The  vessel was injected utilizing a catheter. Left heart catheterization.  Ventriculography was performed. Contrast given: 60 ml Optiray. 75 ml  Optiray. Fluoroscopy time: 6.8 min.  A successful stent was performed on the 90 % lesion in the mid LAD.  Following intervention there was a 1 % residual stenosis. There was LANDON 3  flow before the procedure and LANDON 3 flow after the procedure. Vessel  setup was performed. A 6fr JL 4.0 Zuma Guide guiding catheter was used to  intubate the vessel. Vessel setup was performed. A [employer] 190 Wire wire  was used to cross the lesion. A [place] 2.50 X 14 drug-eluting stent was  placed across the lesion and deployed. A [place] 2.50 X 14 drug-eluting  stent was placed across the lesion and deployed. Balloon angioplasty was   performed, with 1 inflations. Sheath Exchange for Intervention.  Medications given: Midazolam, 1 mg, IV. Fentanyl, 25 mcg, IV. Bivalirudin  (Angiomax), 10.5 ml, IV. Bivalirudin (Angiomax), infusion rate of  mg/kg/hr, IV. Lidocaine 1 percent, SC. Benadryl, 25 mg, IV.  Ventricles: EF estimated by contrast ventriculography was 50 %.  Coronary vessels: The coronary circulation is right dominant.  LM:      LM: Angiography showed minor luminal irregularities with no flow  limiting lesions.  LAD:      Mid LAD: There was a 90 % stenosis.  CX:      Circumflex: Angiography showed minor luminal irregularities with  no flow limiting lesions.  RCA:      RCA: Angiography showed minor luminal irregularities with no flow  limiting lesions.  Complications: There were no complications.  Recommendations:  ASA and Plavix for 1 year  Prepared and signed by  Jaguar Bourne M.D.  Signed 2010 09:52:54  Hemodynamic tables  Pressures:  Baseline/ Room Air  Pressures:  - HR: 102  Pressures:  - Rhythm:  Pressures:  -- Aortic Pressure (S/D/M): 167/87/123  Pressures:  -- Left Ventricle (s/edp): 168/22/--  Pressures:  Intervention  Pressures:  - HR: 82  Pressures:  - Rhythm:  Pressures:  -- Aortic Pressure (S/D/M): 149/78/111  Pressures:  Post Angio  Pressures:  - HR: 61  Pressures:  - Rhythm:  Pressures:  -- Aortic Pressure (S/D/M): 184/87/58  Pressures:  -- Left Ventricle (s/edp): 177/23/--  Outputs:  Baseline/ Room Air  Outputs:  -- CALCULATIONS[age]  Outputs:  -- CALCULATIONS: Body Surface Area: 1.85  Outputs:  -- CALCULATIONS: Height in cm: 178.00  Outputs:  -- CALCULATIONS: Sex: Male  Outputs:  -- CALCULATIONS: Weight in k.00  Outputs:  -- OUTPUTS: O2 consumption: 231.02  Outputs:  -- OUTPUTS: Vo2 Indexed: 125.00  Outputs:  Intervention  Outputs:  -- OUTPUTS: O2 consumption: 231.02  Outputs:  -- OUTPUTS: Vo2 Indexed: 125.00  Outputs:  Post Angio  Outputs:  -- OUTPUTS: O2 consumption: 231.02  Outputs:  -- OUTPUTS: Vo2 Indexed: 125.00      TTE with Doppler (w/Cont) (10.07.20 @ 07:32)  LA:     3.9    2.0 - 4.0 cm  Ao:     3.2    2.0 - 3.8 cm  SEPTUM: 1.2    0.6 - 1.2 cm  PWT:    1.1    0.6 - 1.1 cm  LVIDd:  4.5    3.0 - 5.6 cm  LVIDs:  3.3    1.8 - 4.0 cm  Derived variables:  LVMI: 96 g/m2  RWT: 0.48  Fractional short: 27 %  EF (Visual Estimate): 65-70 %  Doppler Peak Velocity (m/sec): AoV=1.3  ------------------------------------------------------------------------  Observations:  Mitral Valve: Mitral annular calcification, otherwise  normal mitral valve. Minimal mitral regurgitation.  Aortic Valve/Aorta: Calcified trileaflet aortic valve with  normal opening. Peak transaortic valve gradient equals 7 mm  Hg,mean transaortic valve gradient equals 4 mm Hg, aortic  valve velocity time integral equals 30 cm. No aortic valve  regurgitation seen. Peak left ventricular outflow tract  gradient equals 2 mm Hg, mean gradient is equal to 1 mm Hg,  LVOT velocity time integral equals 21 cm.  Aortic Root: 3.2 cm.  Left Atrium: Left atrium not well visualized, probably  normal.  Left Ventricle: Endocardial visualization enhanced with  intravenous injection of Ultrasonic Enhancing Agent  (Definity).  Normal left ventricular systolic function. No  segmental wall motion abnormalities. Increased relative  wall thickness with normal left ventricular mass index,  consistent with concentric left ventricular remodeling.  Indeterminate diastolic function.  Right Heart:Right atrium not well visualized, probably  normal. The right ventricle is not well visualized; grossly  normal right ventricular systolic function. Normal  tricuspid valve. Mild tricuspid regurgitation. Pulmonic  valve not well visualized. No pulmonic regurgitation.  Pericardium/Pleura: Normal pericardium with no pericardial  effusion.  Hemodynamic: Estimated right atrial pressure is 8 mm Hg.  Estimated right ventricular systolic pressure equals 33 mm  Hg, assuming right atrial pressure equals 8mm Hg,  consistent with normal pulmonary pressures.  ------------------------------------------------------------------------  Conclusions:  1. Mitral annular calcification, otherwise normal mitral  valve. Minimal mitral regurgitation.  2. Calcified trileaflet aortic valve with normal opening.  No aortic valve regurgitation seen.  3. Endocardial visualization enhanced with intravenous  injection of Ultrasonic Enhancing Agent (Definity).  Normal  left ventricular systolic function. No segmental wall  motion abnormalities.  4. The right ventricle is not well visualized; grossly  normal right ventricular systolic function.  *** No previous Echo exam.    < end of copied text >

## 2020-10-08 NOTE — PROGRESS NOTE ADULT - ASSESSMENT
75 M with a history of CAD s/p PCI over 10 years ago at [facility] (no records), HTN, HLD and prior smoker p/w chest pain now s/p with JONNY to mLAD 80% stenosis.    #CAD  -s/p DESx2 to mLAD 80% stenosis  -residual non-obs dz, 30% LM, 30% oLCx, diffuse dz OM  -c/w ASA/Plavix for at least 1 year  -c/w high intensity statin  -RRA site neurovascularly intact  -ongoing mgmt per general cardiology  -no contraindications to discharge from interventional cardiology perspective  -wrist precautions (no driving 2 days, no heavy lifting 1 week (5 pound limit), no swimming or baths 1 week)  -1 week follow up with outpatient cardiologist    Dax Munroe MD  Cardiology Fellow - PGY 5  For all New Consults and Questions:  [email or web address]   Login: [employer]

## 2020-10-08 NOTE — PROGRESS NOTE ADULT - ASSESSMENT
75 M with a history of CAD s/p PCI over 10 years ago at [facility] (no records), HTN, HLD and prior smoker. Presented with chest pain, now s/p [facility] with JONNY to mLAD 80% stenosis.    Patient has continued to endorse 6/10 left sided chest pain since [facility]. Per floor team, patient has remained in sinus rhythm without any significant tele-events and no ST or T wave abnormalities. Patient has had 3x negative troponins and his vitals have been stable. Per cath team, CP most likely 2/2 stretching of vessels after stent placement and will resolve with time. No objection to discharge today.     REC:  #CAD s/p PCI 10/07 -   -- [facility] 10/07 with mLAD 80% stenosis, LM 30% stenosis, LCx 30% stenosis, OM1 80% stenosis, s/p JONNY to mLAD stenosis, OM1 was small vessel and ostial, no PCI was done  -- [facility] 3/16/2010 with mLAD 90% stenosis, stent placement  -- c/w DAPT with ASA and Plavix for 1 year  -- TTE EF 65%, no WMA  -- c/w Lipitor 80mg qhs  -- C/w metop tartrate 50 BID and losartan 100 daily  -- Will need close cardiology f/u as outpatient - recommend f/u in 1-2 weeks    Cardiology consult will be signing off   75 M with a history of CAD s/p PCI over 10 years ago at [facility] (no records), HTN, HLD and prior smoker. Presented with chest pain, now s/p [facility] with JONNY to mLAD 80% stenosis.    Patient has continued to experience left sided chest pain since [facility]. Per floor team, patient has remained in sinus rhythm without any significant tele-events and no ST or T wave abnormalities. Patient has had 3x negative troponins and his vitals have been stable. Per cath team/Dr. Bourne, CP most likely 2/2 stretching of vessels after stent placement and will resolve with time. No objection to discharge today.       REC:  #CAD s/p PCI 10/07 -   -- [facility] 10/07 with mLAD 80% stenosis, LM 30% stenosis, LCx 30% stenosis, OM1 80% stenosis, s/p JONNY to mLAD stenosis, OM1 was small vessel and ostial, no PCI was done  -- [facility] 3/16/2010 with mLAD 90% stenosis, stent placement  -- c/w DAPT with ASA and Plavix for 1 year  -- TTE EF 65%, no WMA  -- c/w Lipitor 80mg qhs  -- C/w metop tartrate 50 BID and losartan 100 daily  -- Should f/u with his private cardiologist in 1-2 weeks    All Cardiology service information can be found 24/7 on [email or web address] - use password: [email or web address] to log in.

## 2020-10-08 NOTE — PROGRESS NOTE ADULT - NSHPATTENDINGPLANDISCUSS_GEN_ALL_CORE
cardiology fellow; patient seen and examined.       I was physically present for the key portions of the evaluation and management (E/M) service provided.    I agree with the above history, physical, and plan which I have reviewed and edited where appropriate.
cardiology fellow and student; patient seen and examined.       I was physically present for the key portions of the evaluation and management (E/M) service provided.    I agree with the above history, physical, and plan which I have reviewed and edited where appropriate.

## 2020-10-08 NOTE — PROGRESS NOTE ADULT - REASON FOR ADMISSION
75M presents per outpatient cardiology for evaluation of chest pain

## 2020-10-08 NOTE — PROGRESS NOTE ADULT - SUBJECTIVE AND OBJECTIVE BOX
Patient seen and examined at bedside.    Overnight Events:   episode of atypical chest pain overnight, self resolved.  Not similar to chest pain that preceded hospitalization.  asymptomatic this AM. no active complaints.      REVIEW OF SYSTEMS:  Constitutional:     [x ] negative [ ] fevers [ ] chills [ ] weight loss [ ] weight gain  HEENT:                  [x ] negative [ ] dry eyes [ ] eye irritation [ ] postnasal drip [ ] nasal congestion  CV:                         [ x] negative  [ ] chest pain [ ] orthopnea [ ] palpitations [ ] murmur  Resp:                     [ x] negative [ ] cough [ ] shortness of breath [ ] dyspnea [ ] wheezing [ ] sputum [ ]hemoptysis  GI:                          [ x] negative [ ] nausea [ ] vomiting [ ] diarrhea [ ] constipation [ ] abd pain [ ] dysphagia   :                        [ x] negative [ ] dysuria [ ] nocturia [ ] hematuria [ ] increased urinary frequency  Musculoskeletal: [ x] negative [ ] back pain [ ] myalgias [ ] arthralgias [ ] fracture  Skin:                       [ x] negative [ ] rash [ ] itch  Neurological:        [x ] negative [ ] headache [ ] dizziness [ ] syncope [ ] weakness [ ] numbness  Psychiatric:           [ x] negative [ ] anxiety [ ] depression  Endocrine:            [ x] negative [ ] diabetes [ ] thyroid problem  Heme/Lymph:      [ x] negative [ ] anemia [ ] bleeding problem  Allergic/Immune: [ x] negative [ ] itchy eyes [ ] nasal discharge [ ] hives [ ] angioedema    [ x] All other systems negative  [ ] Unable to assess ROS due to    Current Meds:  aspirin enteric coated 81 milliGRAM(s) Oral daily  atorvastatin 80 milliGRAM(s) Oral at bedtime  budesonide  80 MICROgram(s)/formoterol 4.5 MICROgram(s) Inhaler 2 Puff(s) Inhalation two times a day  clopidogrel Tablet 75 milliGRAM(s) Oral daily  heparin   Injectable 5000 Unit(s) SubCutaneous every 8 hours  hydrochlorothiazide 12.5 milliGRAM(s) Oral daily  isosorbide   dinitrate Tablet (ISORDIL) 5 milliGRAM(s) Oral three times a day  losartan 100 milliGRAM(s) Oral daily  melatonin 3 milliGRAM(s) Oral at bedtime PRN  metoprolol tartrate 50 milliGRAM(s) Oral two times a day  sodium chloride 0.9%. 1000 milliLiter(s) IV Continuous <Continuous>  tamsulosin 0.4 milliGRAM(s) Oral at bedtime      PAST MEDICAL & SURGICAL HISTORY:  Myocardial Infarction    Coronary Artery Disease  treated medically    Hyperlipemia    Hypertension    History of Hemorrhoidectomy        Vitals:  T(F): 97.8 (10-08), Max: 98.4 (10-08)  HR: 76 (10-08) (58 - 81)  BP: 154/78 (10-08) (116/78 - 190/91)  RR: 18 (10-08)  SpO2: 95% (10-08)  I&O's Summary    07 Oct 2020 07:01  -  08 Oct 2020 07:00  --------------------------------------------------------  IN: 120 mL / OUT: 850 mL / NET: -730 mL    08 Oct 2020 07:01  -  08 Oct 2020 11:08  --------------------------------------------------------  IN: 240 mL / OUT: 600 mL / NET: -360 mL        Physical Exam:  Appearance: No acute distress; well appearing  Eyes: PERRL, EOMI, pink conjunctiva  HENT: Normal oral mucosa  Cardiovascular: RRR, S1, S2, no murmurs, rubs, or gallops; no edema; no JVD  Respiratory: Clear to auscultation bilaterally  Gastrointestinal: soft, non-tender, non-distended with normal bowel sounds  Musculoskeletal: No clubbing; no joint deformity   Neurologic: Non-focal  Lymphatic: No lymphadenopathy  Psychiatry: AAOx3, mood & affect appropriate  Skin: No rashes, ecchymoses, or cyanosis  RRA:  radial pulse 2+, trivial ecchymosis, no hematoma, motor and sensory intact                           13.6   8.43  )-----------( 142      ( 08 Oct 2020 05:47 )             41.2     10-08    137  |  103  |  15  ----------------------------<  104<H>  4.1   |  25  |  1.50<H>    Ca    9.1      08 Oct 2020 05:47  Phos  3.9     10-07  Mg     2.0     10-08    TPro  6.8  /  Alb  3.7  /  TBili  1.1  /  DBili  x   /  AST  46<H>  /  ALT  16  /  AlkPhos  79  10-08    PT/INR - ( 07 Oct 2020 06:05 )   PT: 12.5 sec;   INR: 1.04 ratio         PTT - ( 07 Oct 2020 06:05 )  PTT:69.4 sec      Serum Pro-Brain Natriuretic Peptide: 247 pg/mL (10-06 @ 16:35)        Interpretation of Telemetry: rare PVC

## 2020-10-08 NOTE — PROGRESS NOTE ADULT - ATTENDING COMMENTS
74 yo man with CAD s/p successful PCI     Continue DAPT  Aggressive medical management and risk factor modification  Follow up with cardiologist in 1-2 weeks.

## 2021-06-24 ENCOUNTER — INPATIENT (INPATIENT)
Facility: HOSPITAL | Age: 76
LOS: 4 days | Discharge: HOME | End: 2021-06-29
Attending: INTERNAL MEDICINE | Admitting: INTERNAL MEDICINE
Payer: MEDICARE

## 2021-06-24 VITALS
HEART RATE: 62 BPM | RESPIRATION RATE: 16 BRPM | DIASTOLIC BLOOD PRESSURE: 78 MMHG | OXYGEN SATURATION: 100 % | TEMPERATURE: 98 F | HEIGHT: 70 IN | WEIGHT: 179.02 LBS | SYSTOLIC BLOOD PRESSURE: 154 MMHG

## 2021-06-24 DIAGNOSIS — R06.2 WHEEZING: ICD-10-CM

## 2021-06-24 DIAGNOSIS — I50.9 HEART FAILURE, UNSPECIFIED: ICD-10-CM

## 2021-06-24 DIAGNOSIS — R06.00 DYSPNEA, UNSPECIFIED: ICD-10-CM

## 2021-06-24 DIAGNOSIS — I10 ESSENTIAL (PRIMARY) HYPERTENSION: ICD-10-CM

## 2021-06-24 DIAGNOSIS — Z29.9 ENCOUNTER FOR PROPHYLACTIC MEASURES, UNSPECIFIED: ICD-10-CM

## 2021-06-24 DIAGNOSIS — N40.0 BENIGN PROSTATIC HYPERPLASIA WITHOUT LOWER URINARY TRACT SYMPTOMS: ICD-10-CM

## 2021-06-24 DIAGNOSIS — N18.9 CHRONIC KIDNEY DISEASE, UNSPECIFIED: ICD-10-CM

## 2021-06-24 DIAGNOSIS — I25.10 ATHEROSCLEROTIC HEART DISEASE OF NATIVE CORONARY ARTERY WITHOUT ANGINA PECTORIS: ICD-10-CM

## 2021-06-24 LAB
A1C WITH ESTIMATED AVERAGE GLUCOSE RESULT: 6 % — HIGH (ref 4–5.6)
ALBUMIN SERPL ELPH-MCNC: 4 G/DL — SIGNIFICANT CHANGE UP (ref 3.3–5)
ALP SERPL-CCNC: 90 U/L — SIGNIFICANT CHANGE UP (ref 40–120)
ALT FLD-CCNC: 12 U/L — SIGNIFICANT CHANGE UP (ref 4–41)
ANION GAP SERPL CALC-SCNC: 9 MMOL/L — SIGNIFICANT CHANGE UP (ref 7–14)
AST SERPL-CCNC: 21 U/L — SIGNIFICANT CHANGE UP (ref 4–40)
BASOPHILS # BLD AUTO: 0.03 K/UL — SIGNIFICANT CHANGE UP (ref 0–0.2)
BASOPHILS NFR BLD AUTO: 0.5 % — SIGNIFICANT CHANGE UP (ref 0–2)
BILIRUB SERPL-MCNC: 0.8 MG/DL — SIGNIFICANT CHANGE UP (ref 0.2–1.2)
BUN SERPL-MCNC: 14 MG/DL — SIGNIFICANT CHANGE UP (ref 7–23)
CALCIUM SERPL-MCNC: 9.7 MG/DL — SIGNIFICANT CHANGE UP (ref 8.4–10.5)
CHLORIDE SERPL-SCNC: 104 MMOL/L — SIGNIFICANT CHANGE UP (ref 98–107)
CO2 SERPL-SCNC: 25 MMOL/L — SIGNIFICANT CHANGE UP (ref 22–31)
CREAT SERPL-MCNC: 1.59 MG/DL — HIGH (ref 0.5–1.3)
EOSINOPHIL # BLD AUTO: 0.13 K/UL — SIGNIFICANT CHANGE UP (ref 0–0.5)
EOSINOPHIL NFR BLD AUTO: 2.2 % — SIGNIFICANT CHANGE UP (ref 0–6)
ESTIMATED AVERAGE GLUCOSE: 126 MG/DL — HIGH (ref 68–114)
GLUCOSE SERPL-MCNC: 86 MG/DL — SIGNIFICANT CHANGE UP (ref 70–99)
HCT VFR BLD CALC: 39.4 % — SIGNIFICANT CHANGE UP (ref 39–50)
HGB BLD-MCNC: 12.2 G/DL — LOW (ref 13–17)
IANC: 3.9 K/UL — SIGNIFICANT CHANGE UP (ref 1.5–8.5)
IMM GRANULOCYTES NFR BLD AUTO: 0.2 % — SIGNIFICANT CHANGE UP (ref 0–1.5)
LYMPHOCYTES # BLD AUTO: 1.3 K/UL — SIGNIFICANT CHANGE UP (ref 1–3.3)
LYMPHOCYTES # BLD AUTO: 22.5 % — SIGNIFICANT CHANGE UP (ref 13–44)
MCHC RBC-ENTMCNC: 28 PG — SIGNIFICANT CHANGE UP (ref 27–34)
MCHC RBC-ENTMCNC: 31 GM/DL — LOW (ref 32–36)
MCV RBC AUTO: 90.6 FL — SIGNIFICANT CHANGE UP (ref 80–100)
MONOCYTES # BLD AUTO: 0.41 K/UL — SIGNIFICANT CHANGE UP (ref 0–0.9)
MONOCYTES NFR BLD AUTO: 7.1 % — SIGNIFICANT CHANGE UP (ref 2–14)
NEUTROPHILS # BLD AUTO: 3.9 K/UL — SIGNIFICANT CHANGE UP (ref 1.8–7.4)
NEUTROPHILS NFR BLD AUTO: 67.5 % — SIGNIFICANT CHANGE UP (ref 43–77)
NRBC # BLD: 0 /100 WBCS — SIGNIFICANT CHANGE UP
NRBC # FLD: 0 K/UL — SIGNIFICANT CHANGE UP
NT-PROBNP SERPL-SCNC: 182 PG/ML — SIGNIFICANT CHANGE UP
PLATELET # BLD AUTO: 144 K/UL — LOW (ref 150–400)
POTASSIUM SERPL-MCNC: 4.4 MMOL/L — SIGNIFICANT CHANGE UP (ref 3.5–5.3)
POTASSIUM SERPL-SCNC: 4.4 MMOL/L — SIGNIFICANT CHANGE UP (ref 3.5–5.3)
PROT SERPL-MCNC: 7.3 G/DL — SIGNIFICANT CHANGE UP (ref 6–8.3)
RBC # BLD: 4.35 M/UL — SIGNIFICANT CHANGE UP (ref 4.2–5.8)
RBC # FLD: 13.7 % — SIGNIFICANT CHANGE UP (ref 10.3–14.5)
SODIUM SERPL-SCNC: 138 MMOL/L — SIGNIFICANT CHANGE UP (ref 135–145)
TROPONIN T, HIGH SENSITIVITY RESULT: 25 NG/L — SIGNIFICANT CHANGE UP
TROPONIN T, HIGH SENSITIVITY RESULT: 25 NG/L — SIGNIFICANT CHANGE UP
WBC # BLD: 5.78 K/UL — SIGNIFICANT CHANGE UP (ref 3.8–10.5)
WBC # FLD AUTO: 5.78 K/UL — SIGNIFICANT CHANGE UP (ref 3.8–10.5)

## 2021-06-24 PROCEDURE — 99285 EMERGENCY DEPT VISIT HI MDM: CPT

## 2021-06-24 PROCEDURE — 99223 1ST HOSP IP/OBS HIGH 75: CPT

## 2021-06-24 PROCEDURE — 71046 X-RAY EXAM CHEST 2 VIEWS: CPT | Mod: 26

## 2021-06-24 RX ORDER — ISOSORBIDE DINITRATE 5 MG/1
1 TABLET ORAL
Qty: 0 | Refills: 0 | DISCHARGE

## 2021-06-24 RX ORDER — FUROSEMIDE 40 MG
20 TABLET ORAL DAILY
Refills: 0 | Status: DISCONTINUED | OUTPATIENT
Start: 2021-06-25 | End: 2021-06-26

## 2021-06-24 RX ORDER — ALBUTEROL 90 UG/1
2 AEROSOL, METERED ORAL EVERY 6 HOURS
Refills: 0 | Status: DISCONTINUED | OUTPATIENT
Start: 2021-06-24 | End: 2021-06-29

## 2021-06-24 RX ORDER — VALSARTAN 80 MG/1
320 TABLET ORAL DAILY
Refills: 0 | Status: DISCONTINUED | OUTPATIENT
Start: 2021-06-25 | End: 2021-06-26

## 2021-06-24 RX ORDER — HEPARIN SODIUM 5000 [USP'U]/ML
5000 INJECTION INTRAVENOUS; SUBCUTANEOUS EVERY 12 HOURS
Refills: 0 | Status: DISCONTINUED | OUTPATIENT
Start: 2021-06-24 | End: 2021-06-29

## 2021-06-24 RX ORDER — TAMSULOSIN HYDROCHLORIDE 0.4 MG/1
1 CAPSULE ORAL
Qty: 0 | Refills: 0 | DISCHARGE

## 2021-06-24 RX ORDER — BUDESONIDE AND FORMOTEROL FUMARATE DIHYDRATE 160; 4.5 UG/1; UG/1
2 AEROSOL RESPIRATORY (INHALATION)
Refills: 0 | Status: DISCONTINUED | OUTPATIENT
Start: 2021-06-24 | End: 2021-06-29

## 2021-06-24 RX ORDER — ASPIRIN/CALCIUM CARB/MAGNESIUM 324 MG
81 TABLET ORAL DAILY
Refills: 0 | Status: DISCONTINUED | OUTPATIENT
Start: 2021-06-25 | End: 2021-06-29

## 2021-06-24 RX ORDER — TAMSULOSIN HYDROCHLORIDE 0.4 MG/1
0.4 CAPSULE ORAL AT BEDTIME
Refills: 0 | Status: DISCONTINUED | OUTPATIENT
Start: 2021-06-24 | End: 2021-06-29

## 2021-06-24 RX ORDER — HYDRALAZINE HCL 50 MG
25 TABLET ORAL THREE TIMES A DAY
Refills: 0 | Status: DISCONTINUED | OUTPATIENT
Start: 2021-06-24 | End: 2021-06-29

## 2021-06-24 RX ORDER — ATORVASTATIN CALCIUM 80 MG/1
40 TABLET, FILM COATED ORAL AT BEDTIME
Refills: 0 | Status: DISCONTINUED | OUTPATIENT
Start: 2021-06-24 | End: 2021-06-29

## 2021-06-24 RX ORDER — ATORVASTATIN CALCIUM 80 MG/1
1 TABLET, FILM COATED ORAL
Qty: 0 | Refills: 0 | DISCHARGE

## 2021-06-24 RX ORDER — HYDROCHLOROTHIAZIDE 25 MG
12.5 TABLET ORAL DAILY
Refills: 0 | Status: DISCONTINUED | OUTPATIENT
Start: 2021-06-25 | End: 2021-06-25

## 2021-06-24 RX ORDER — METOPROLOL TARTRATE 50 MG
50 TABLET ORAL
Refills: 0 | Status: DISCONTINUED | OUTPATIENT
Start: 2021-06-25 | End: 2021-06-29

## 2021-06-24 RX ORDER — ISOSORBIDE DINITRATE 5 MG/1
5 TABLET ORAL
Refills: 0 | Status: DISCONTINUED | OUTPATIENT
Start: 2021-06-24 | End: 2021-06-29

## 2021-06-24 RX ORDER — METOPROLOL TARTRATE 50 MG
1 TABLET ORAL
Qty: 0 | Refills: 0 | DISCHARGE

## 2021-06-24 RX ORDER — CLOPIDOGREL BISULFATE 75 MG/1
75 TABLET, FILM COATED ORAL DAILY
Refills: 0 | Status: DISCONTINUED | OUTPATIENT
Start: 2021-06-25 | End: 2021-06-29

## 2021-06-24 RX ORDER — FUROSEMIDE 40 MG
40 TABLET ORAL ONCE
Refills: 0 | Status: COMPLETED | OUTPATIENT
Start: 2021-06-24 | End: 2021-06-24

## 2021-06-24 RX ORDER — ASPIRIN/CALCIUM CARB/MAGNESIUM 324 MG
1 TABLET ORAL
Qty: 0 | Refills: 0 | DISCHARGE

## 2021-06-24 RX ORDER — HYDRALAZINE HCL 50 MG
1 TABLET ORAL
Qty: 0 | Refills: 0 | DISCHARGE

## 2021-06-24 RX ORDER — IPRATROPIUM/ALBUTEROL SULFATE 18-103MCG
3 AEROSOL WITH ADAPTER (GRAM) INHALATION
Refills: 0 | Status: COMPLETED | OUTPATIENT
Start: 2021-06-24 | End: 2021-06-24

## 2021-06-24 RX ADMIN — Medication 3 MILLILITER(S): at 16:52

## 2021-06-24 RX ADMIN — Medication 40 MILLIGRAM(S): at 16:52

## 2021-06-24 RX ADMIN — TAMSULOSIN HYDROCHLORIDE 0.4 MILLIGRAM(S): 0.4 CAPSULE ORAL at 22:39

## 2021-06-24 RX ADMIN — Medication 3 MILLILITER(S): at 18:16

## 2021-06-24 RX ADMIN — Medication 3 MILLILITER(S): at 17:43

## 2021-06-24 RX ADMIN — Medication 25 MILLIGRAM(S): at 22:39

## 2021-06-24 RX ADMIN — ATORVASTATIN CALCIUM 40 MILLIGRAM(S): 80 TABLET, FILM COATED ORAL at 22:38

## 2021-06-24 NOTE — ED ADULT TRIAGE NOTE - PATIENT ON (OXYGEN DELIVERY METHOD)
room air Prudence Ronquillo  (RN)  2020 21:20:55 Unique Kc  (RN)  2020 21:51:37 Rossy Hill  (RN)  2020 21:29:04

## 2021-06-24 NOTE — H&P ADULT - PROBLEM SELECTOR PLAN 1
Acute on chronic over the last few days but has been endorsing for months. ET down to <1 block, no orthopnea/PND. Can lie flat. Currently comfortable at rest, no hypoxia. ED heard wheezing but currently without. Unclear if in CHF exacerbation as CXR clear, lungs are clear though given lasix already, probnp not elevated. May have undiagnosed COPD as has chronic cough and wheezing noted on October admission and ED this admission. Does not appear to have followed up with pulm  Exertional chest pressure with significant cardiac disease is concerning for an anginal equivalent however and was sent for cardiac cath by cardiologist  -monitor on tele  -obtain echo (prior in October was normal EF)  -ischemic eval per cardiology primary team in AM. Likely will require cardiac cath but would speak to renal given CKD and closely monitor volume status as got lasix 40 IV already. Recheck BMP tomorrow AM  -probnp 182, trops 25--25, EKG nonischemic. No active chest pain, not ACS  -BMP in AM, add on Mg. Keep K >4 and Mg >2  -strict I's and O's and daily weights, 1.2L fluid restriction  -should have PFTs as outpatient  -restart symbicort, alb PRN. Hold off on steroids for now as does not appear to have acute COPD exacerbation without wheezing on exam at present Acute on chronic over the last few days but has been endorsing for months. ET down to <1 block, no orthopnea/PND. Can lie flat. Currently comfortable at rest, no hypoxia. ED heard wheezing but currently without. Unclear if in CHF exacerbation as CXR clear, lungs are clear though given lasix already, probnp not elevated. May have undiagnosed COPD as has chronic cough and wheezing noted on October admission and ED this admission. Does not appear to have followed up with pulm  Exertional chest pressure with significant cardiac disease is concerning for an anginal equivalent however and was sent for cardiac cath by cardiologist  -monitor on tele  -obtain echo (prior in October was normal EF)  -ischemic eval per cardiology primary team in AM. Likely will require cardiac cath but would speak to renal given CKD and closely monitor volume status as got lasix 40 IV already. Recheck BMP tomorrow AM  -will lower to lasix 20 IV qd for now, pending further cardiology recommendations  -probnp 182, trops 25--25, EKG nonischemic. No active chest pain, not ACS  -BMP in AM, add on Mg. Keep K >4 and Mg >2  -strict I's and O's and daily weights, 1.2L fluid restriction  -should have PFTs as outpatient  -restart symbicort, alb PRN. Hold off on steroids for now as does not appear to have acute COPD exacerbation without wheezing on exam at present

## 2021-06-24 NOTE — H&P ADULT - NSHPREVIEWOFSYSTEMS_GEN_ALL_CORE
ROS:    Constitutional: [ ] fevers [ ] chills   HEENT: [ ] dry eyes [ ] eye irritation   CV: [ x] chest pain [ ] orthopnea [ ] palpitations [ ] murmur  Resp: [ x] cough [ x] shortness of breath [x ] dyspnea [ ] wheezing [ ] sputum  GI: [ ] nausea [ ] vomiting [ ] diarrhea [ ] constipation [ ] abd pain   : [ ] dysuria [ ] nocturia [ ] hematuria   Musculoskeletal: [ ] back pain [ ] myalgias [ ] arthralgias [ ] fracture  Skin: [ ] rash [ ] itch  Neurological: [ ] headache [ ] dizziness [ ] syncope  Psychiatric: [ ] anxiety [ ] depression  Endocrine: [ ] diabetes [ ] thyroid problem  Hematologic/Lymphatic: [ ] anemia [ ] bleeding problem  Allergic/Immunologic: [ ] itchy eyes [ ] nasal discharge [ ] hives [ ] angioedema  [x ] All other systems negative

## 2021-06-24 NOTE — H&P ADULT - PROBLEM SELECTOR PLAN 2
-should have PFTs as outpatient  -restart symbicort, alb PRN. Hold off on steroids for now as does not appear to have acute COPD exacerbation without wheezing on exam at present

## 2021-06-24 NOTE — ED PROVIDER NOTE - ATTENDING CONTRIBUTION TO CARE
74 yo m past medical history cad, htn, hld, ckd, bph, former smoker with several days of sob, decreased ET, edema. also reports atypical sharp pinching cp last seconds. denies fever chills, cough, abd pain dysuria, numbness, weakness. exam as above. Ddx includes, but not limited to, chf, rad. plan: labs, cxr, lasix, ganga, tele, tba.

## 2021-06-24 NOTE — H&P ADULT - NSICDXPASTMEDICALHX_GEN_ALL_CORE_FT
PAST MEDICAL HISTORY:  BPH (benign prostatic hyperplasia)     CKD (chronic kidney disease)     Coronary Artery Disease treated medically    Hyperlipemia     Hypertension     Myocardial Infarction

## 2021-06-24 NOTE — H&P ADULT - HISTORY OF PRESENT ILLNESS
75M with PMHx CAD s/p MI 1994 (LAD stent 2010, prox - midLAD stents x2 in 10/2020), HTN, HLD, former smoker (quit 30 yr ago, 20pack year) enlarged prostate,  CKD stage III presenting with acute on chronic LEMON. Pt was sent in from Dr. Eloy Duran's (cardiology) office with note in paper chart "Being sent to the ER with EMS for coronary angiogram. He plans to follow up in the office in two weeks". Also note states "ASHD EF 65% 10/6/20, 10/7/20 PCI/JONNY to 80% mLAD, 30% distal LM & ostial CFX, 80% small sized OM1, s/p PCI -LAD 3/16/10 [facility]. 10/2020 cath report in [facility] as well. Patient was discharged on DAPT for one year which he is taking. Patient endorses exertional chest pressure for the last 7 months but much worse over the last few days. Pressure is 10/10, sometimes radiating to left arm, relieved after 2-3 minutes of rest. Pain is worse when exerting himself and almost immediately happens when exerting. ET now down to <1 block. He also endorses LE edema which is chronic that comes and goes. Denies orthopnea or PND. Not on lasix but is on valsartan/HCTZ. He denies hx of COPD/asthma and does not take inhalers. On October admission he was noted to have wheezing and empirically started on symbicort and rec'd to f/u with pulm which does not appear to have happened. In ED found to have wheezing and given duonebs but now without wheezing. Also given 40 IV lasix. Probnp normal, CXR clear. Patient denies fevers, chills, abdominal pain, vomiting, diarrhea, unilateral leg swelling/pain. He has a chronic cough with clear sputum

## 2021-06-24 NOTE — ED ADULT NURSE NOTE - NSIMPLEMENTINTERV_GEN_ALL_ED
Implemented All Fall with Harm Risk Interventions:  [place] to call system. Call bell, personal items and telephone within reach. Instruct patient to call for assistance. Room bathroom lighting operational. Non-slip footwear when patient is off stretcher. Physically safe environment: no spills, clutter or unnecessary equipment. Stretcher in lowest position, wheels locked, appropriate side rails in place. Provide visual cue, wrist band, yellow gown, etc. Monitor gait and stability. Monitor for mental status changes and reorient to person, place, and time. Review medications for side effects contributing to fall risk. Reinforce activity limits and safety measures with patient and family. Provide visual clues: red socks.

## 2021-06-24 NOTE — ED PROVIDER NOTE - PROGRESS NOTE DETAILS
Labs/imaging appreciated, pt clinically w/ volume overload , given lasix, will admit to Dr. York on tele.

## 2021-06-24 NOTE — H&P ADULT - PROBLEM SELECTOR PLAN 3
Resume ASA, plavix, metoprolol 50mg BID, atorvastatin 40mg qd, isosorbide dinitrate 5mg BID  Cards eval in AM

## 2021-06-24 NOTE — H&P ADULT - NSHPPHYSICALEXAM_GEN_ALL_CORE
PHYSICAL EXAM:  Vital Signs Last 24 Hrs  T(C): 36.6 (06-24-21 @ 21:13)  T(F): 97.8 (06-24-21 @ 21:13), Max: 97.8 (06-24-21 @ 21:13)  HR: 82 (06-24-21 @ 21:13) (59 - 82)  BP: 152/71 (06-24-21 @ 21:13)  BP(mean): --  RR: 18 (06-24-21 @ 21:13) (16 - 18)  SpO2: 97% (06-24-21 @ 21:13) (97% - 100%)  Wt(kg): --    06-24 @ 07:01  -  06-24 @ 22:15  --------------------------------------------------------  IN: 0 mL / OUT: 300 mL / NET: -300 mL      Constitutional: NAD, awake and alert, well developed  EYES: EOMI, conjunctiva clear  ENT:  Normal Hearing, no tonsillar exudates   Neck: Soft and supple , no thyromegaly   Respiratory: Breath sounds are clear bilaterally, No wheezing, rales or rhonchi, no tachypnea, no accessory muscle use  Cardiovascular: S1 and S2, regular rate and rhythm, no Murmurs, gallops or rubs, no JVD, 2+ pitting edema in distal leg/ankle  Gastrointestinal: Bowel Sounds present, soft, nontender, nondistended, no guarding, no rebound  Extremities: No cyanosis or clubbing; warm to touch  Neurological: No focal deficits, CN II-XII intact bilaterally, sensation to light touch intact in all extremities. Pupils are equally reactive to light and symmetrical in size.   Musculoskeletal: 5/5 strength b/l upper and lower extremities; no joint swelling.  Skin: No rashes, no ulcerations

## 2021-06-24 NOTE — ED PROVIDER NOTE - PMH
Coronary Artery Disease  treated medically  Hyperlipemia    Hypertension    Myocardial Infarction

## 2021-06-24 NOTE — H&P ADULT - ASSESSMENT
75M with PMHx CAD s/p MI 1994 (LAD stent 2010, prox - midLAD stents x2 in 10/2020), HTN, HLD, former smoker (quit 30 yr ago, 20pack year) enlarged prostate,  CKD stage III presenting with acute on chronic LEMON. Also with exertional CP

## 2021-06-24 NOTE — ED PROVIDER NOTE - CLINICAL SUMMARY MEDICAL DECISION MAKING FREE TEXT BOX
A:  -75M w/ CAD s/p stents, HTN, HLD, former smoker (quit 30 yr ago, 20pack year) enlarged prostate, CAD/MI w/ 2 stents on Plavix/ASA, CDK stage III presents to the ED c/o shortness of breath, peripheral edema and decreased exercise tolerance which is worsening over last few days.   P:  -Labs, CXR, EKG, Lasix/Albuterol, likely admission

## 2021-06-24 NOTE — ED PROVIDER NOTE - OBJECTIVE STATEMENT
75M w/ CAD s/p stents, HTN, HLD, former smoker (quit 30 yr ago, 20pack year) enlarged prostate, CAD/MI w/ 2 stents on Plavix/ASA, CDK stage III presents to the ED c/o shortness of breath, peripheral edema and decreased exercise tolerance which is worsening over last few days. Pt reports "pinching" chest pain, non-radiating, no diaphoresis, nausea, abd pain, n/v/d.

## 2021-06-24 NOTE — ED ADULT TRIAGE NOTE - CHIEF COMPLAINT QUOTE
Patient c/o of chest pain and shortness of breath with exertion noted with lower extremity edema patient in triage denies chest pain. PMD CAD Cardiac Stents.

## 2021-06-24 NOTE — H&P ADULT - NSHPLABSRESULTS_GEN_ALL_CORE
I have personally reviewed this patient's labs below:                        12.2   5.78  )-----------( 144      ( 24 Jun 2021 17:02 )             39.4     06-24-21 @ 17:02    138  |  104  |  14             --------------------------< 86     4.4  |  25  | 1.59<H>    eGFR AA: 49<L>  eGFR N-AA: 42<L>    Calcium: 9.7  Phosphorus: --  Magnesium: --    AST: 21    ALT: 12  AlkPhos: 90  Protein: 7.3  Albumin: 4.0  TBili: 0.8  D-Bili: --    Troponin 25--25  Probnp 182    I have personally reviewed this patient's EKG and my independent interpretation is NSR, Q waves in V2 and V3, no ST depressions or elevations similar to prior in October    I have personally reviewed this patient's CXR and my independent interpretation is clear lungs, no pleural effusions or consolidation      Review and summation of old records:  Cardiac cath 10/7/2020  DIAGNOSTIC RECOMMENDATIONS: 1. Long calcified disease of the prox-mid LAD  just proximal to the prior mid LAD stent.  2. Successful PCI (JONNY x 2) of the proximal to mid LAD, with LANDON 3 flow.  3. Continue DAPT and statin.  4. OM1 is a small vessel with severe ostial disease  5. Mild-moderate disease of the distal LM into ostial LCx, continue to  medically manage.  INTERVENTIONAL RECOMMENDATIONS: 1. Long calcified disease of the prox-mid  LAD just proximal to the prior mid LAD stent.  2. Successful PCI (JONNY x 2) of the proximal to mid LAD, with LANDON 3 flow.  3. Continue DAPT and statin.  4. OM1 is a small vessel with severe ostial disease  5. Mild-moderate disease of the distal LM into ostial LCx, continue to  medically manage.      TTE 10/7/2020  Conclusions:  1. Mitral annular calcification, otherwise normal mitral  valve. Minimal mitral regurgitation.  2. Calcified trileaflet aortic valve with normal opening.  No aortic valve regurgitation seen.  3. Endocardial visualization enhanced with intravenous  injection of Ultrasonic Enhancing Agent (Definity).  Normal  left ventricular systolic function. No segmental wall  motion abnormalities.  4. The right ventricle is not well visualized; grossly  normal right ventricular systolic function.  *** No previous Echo exam.

## 2021-06-24 NOTE — H&P ADULT - NSHPSOCIALHISTORY_GEN_ALL_CORE
Former smoker about 30 years ago and had 20pk year smoking hx. Denies ETOH or drug use. Ambulates without assistive devices

## 2021-06-25 LAB
ANION GAP SERPL CALC-SCNC: 12 MMOL/L — SIGNIFICANT CHANGE UP (ref 7–14)
BUN SERPL-MCNC: 16 MG/DL — SIGNIFICANT CHANGE UP (ref 7–23)
CALCIUM SERPL-MCNC: 9.8 MG/DL — SIGNIFICANT CHANGE UP (ref 8.4–10.5)
CHLORIDE SERPL-SCNC: 102 MMOL/L — SIGNIFICANT CHANGE UP (ref 98–107)
CHOLEST SERPL-MCNC: 128 MG/DL — SIGNIFICANT CHANGE UP
CO2 SERPL-SCNC: 25 MMOL/L — SIGNIFICANT CHANGE UP (ref 22–31)
COVID-19 SPIKE DOMAIN AB INTERP: POSITIVE
COVID-19 SPIKE DOMAIN ANTIBODY RESULT: >250 U/ML — HIGH
CREAT SERPL-MCNC: 1.57 MG/DL — HIGH (ref 0.5–1.3)
GLUCOSE SERPL-MCNC: 92 MG/DL — SIGNIFICANT CHANGE UP (ref 70–99)
HCT VFR BLD CALC: 37.6 % — LOW (ref 39–50)
HDLC SERPL-MCNC: 35 MG/DL — LOW
HGB BLD-MCNC: 11.9 G/DL — LOW (ref 13–17)
LIPID PNL WITH DIRECT LDL SERPL: 77 MG/DL — SIGNIFICANT CHANGE UP
MAGNESIUM SERPL-MCNC: 2 MG/DL — SIGNIFICANT CHANGE UP (ref 1.6–2.6)
MAGNESIUM SERPL-MCNC: 2 MG/DL — SIGNIFICANT CHANGE UP (ref 1.6–2.6)
MCHC RBC-ENTMCNC: 28.2 PG — SIGNIFICANT CHANGE UP (ref 27–34)
MCHC RBC-ENTMCNC: 31.6 GM/DL — LOW (ref 32–36)
MCV RBC AUTO: 89.1 FL — SIGNIFICANT CHANGE UP (ref 80–100)
NON HDL CHOLESTEROL: 93 MG/DL — SIGNIFICANT CHANGE UP
NRBC # BLD: 0 /100 WBCS — SIGNIFICANT CHANGE UP
NRBC # FLD: 0 K/UL — SIGNIFICANT CHANGE UP
PLATELET # BLD AUTO: 137 K/UL — LOW (ref 150–400)
POTASSIUM SERPL-MCNC: 3.6 MMOL/L — SIGNIFICANT CHANGE UP (ref 3.5–5.3)
POTASSIUM SERPL-SCNC: 3.6 MMOL/L — SIGNIFICANT CHANGE UP (ref 3.5–5.3)
RBC # BLD: 4.22 M/UL — SIGNIFICANT CHANGE UP (ref 4.2–5.8)
RBC # FLD: 13.7 % — SIGNIFICANT CHANGE UP (ref 10.3–14.5)
SARS-COV-2 IGG+IGM SERPL QL IA: >250 U/ML — HIGH
SARS-COV-2 IGG+IGM SERPL QL IA: POSITIVE
SARS-COV-2 RNA SPEC QL NAA+PROBE: SIGNIFICANT CHANGE UP
SODIUM SERPL-SCNC: 139 MMOL/L — SIGNIFICANT CHANGE UP (ref 135–145)
TRIGL SERPL-MCNC: 80 MG/DL — SIGNIFICANT CHANGE UP
TSH SERPL-MCNC: 0.97 UIU/ML — SIGNIFICANT CHANGE UP (ref 0.27–4.2)
WBC # BLD: 4.44 K/UL — SIGNIFICANT CHANGE UP (ref 3.8–10.5)
WBC # FLD AUTO: 4.44 K/UL — SIGNIFICANT CHANGE UP (ref 3.8–10.5)

## 2021-06-25 PROCEDURE — 93306 TTE W/DOPPLER COMPLETE: CPT | Mod: 26

## 2021-06-25 RX ORDER — POTASSIUM CHLORIDE 20 MEQ
20 PACKET (EA) ORAL ONCE
Refills: 0 | Status: COMPLETED | OUTPATIENT
Start: 2021-06-25 | End: 2021-06-25

## 2021-06-25 RX ADMIN — HEPARIN SODIUM 5000 UNIT(S): 5000 INJECTION INTRAVENOUS; SUBCUTANEOUS at 05:57

## 2021-06-25 RX ADMIN — VALSARTAN 320 MILLIGRAM(S): 80 TABLET ORAL at 05:57

## 2021-06-25 RX ADMIN — Medication 25 MILLIGRAM(S): at 17:20

## 2021-06-25 RX ADMIN — Medication 81 MILLIGRAM(S): at 12:15

## 2021-06-25 RX ADMIN — ISOSORBIDE DINITRATE 5 MILLIGRAM(S): 5 TABLET ORAL at 05:57

## 2021-06-25 RX ADMIN — Medication 50 MILLIGRAM(S): at 17:19

## 2021-06-25 RX ADMIN — BUDESONIDE AND FORMOTEROL FUMARATE DIHYDRATE 2 PUFF(S): 160; 4.5 AEROSOL RESPIRATORY (INHALATION) at 12:15

## 2021-06-25 RX ADMIN — Medication 25 MILLIGRAM(S): at 05:57

## 2021-06-25 RX ADMIN — ISOSORBIDE DINITRATE 5 MILLIGRAM(S): 5 TABLET ORAL at 17:19

## 2021-06-25 RX ADMIN — ATORVASTATIN CALCIUM 40 MILLIGRAM(S): 80 TABLET, FILM COATED ORAL at 23:04

## 2021-06-25 RX ADMIN — Medication 20 MILLIEQUIVALENT(S): at 12:16

## 2021-06-25 RX ADMIN — CLOPIDOGREL BISULFATE 75 MILLIGRAM(S): 75 TABLET, FILM COATED ORAL at 12:15

## 2021-06-25 RX ADMIN — BUDESONIDE AND FORMOTEROL FUMARATE DIHYDRATE 2 PUFF(S): 160; 4.5 AEROSOL RESPIRATORY (INHALATION) at 23:04

## 2021-06-25 RX ADMIN — HEPARIN SODIUM 5000 UNIT(S): 5000 INJECTION INTRAVENOUS; SUBCUTANEOUS at 17:20

## 2021-06-25 RX ADMIN — Medication 25 MILLIGRAM(S): at 23:04

## 2021-06-25 RX ADMIN — TAMSULOSIN HYDROCHLORIDE 0.4 MILLIGRAM(S): 0.4 CAPSULE ORAL at 23:04

## 2021-06-25 RX ADMIN — Medication 12.5 MILLIGRAM(S): at 05:57

## 2021-06-25 RX ADMIN — Medication 50 MILLIGRAM(S): at 05:57

## 2021-06-25 NOTE — CONSULT NOTE ADULT - REASON FOR ADMISSION
LEMON acute on chronic for several days

## 2021-06-25 NOTE — CONSULT NOTE ADULT - SUBJECTIVE AND OBJECTIVE BOX
CHIEF COMPLAINT:Patient is a 75y old  Male who presents with a chief complaint of LEMON acute on chronic for several days (24 Jun 2021 22:04)      HISTORY OF PRESENT ILLNESS:HPI:  75M with PMHx CAD s/p MI 1994 (LAD stent 2010, prox - midLAD stents x2 in 10/2020), HTN, HLD, former smoker (quit 30 yr ago, 20pack year) enlarged prostate,  CKD stage III presenting with acute on chronic LEMON. Pt was sent in from Dr. Eloy Duran's (cardiology) office with note in paper chart "Being sent to the ER with EMS for coronary angiogram. He plans to follow up in the office in two weeks". Also note states "ASHD EF 65% 10/6/20, 10/7/20 PCI/JONNY to 80% mLAD, 30% distal LM & ostial CFX, 80% small sized OM1, s/p PCI -LAD 3/16/10 endeavor. 10/2020 cath report in [facility] as well. Patient was discharged on DAPT for one year which he is taking. Patient endorses exertional chest pressure for the last 7 months but much worse over the last few days. Pressure is 10/10, sometimes radiating to left arm, relieved after 2-3 minutes of rest. Pain is worse when exerting himself and almost immediately happens when exerting. ET now down to <1 block. He also endorses LE edema which is chronic that comes and goes. Denies orthopnea or PND. Not on lasix but is on valsartan/HCTZ. He denies hx of COPD/asthma and does not take inhalers. On October admission he was noted to have wheezing and empirically started on symbicort and rec'd to f/u with pulm which does not appear to have happened. In ED found to have wheezing and given duonebs but now without wheezing. Also given 40 IV lasix. Probnp normal, CXR clear. Patient denies fevers, chills, abdominal pain, vomiting, diarrhea, unilateral leg swelling/pain. He has a chronic cough with clear sputum     (24 Jun 2021 22:04)      PAST MEDICAL & SURGICAL HISTORY:  Hypertension    Hyperlipemia    Coronary Artery Disease  treated medically    Myocardial Infarction    CKD (chronic kidney disease)    BPH (benign prostatic hyperplasia)    History of Hemorrhoidectomy            MEDICATIONS:  aspirin enteric coated 81 milliGRAM(s) Oral daily  clopidogrel Tablet 75 milliGRAM(s) Oral daily  furosemide   Injectable 20 milliGRAM(s) IV Push daily  heparin   Injectable 5000 Unit(s) SubCutaneous every 12 hours  hydrALAZINE 25 milliGRAM(s) Oral three times a day  hydrochlorothiazide 12.5 milliGRAM(s) Oral daily  isosorbide   dinitrate Tablet (ISORDIL) 5 milliGRAM(s) Oral two times a day  metoprolol tartrate 50 milliGRAM(s) Oral two times a day  tamsulosin 0.4 milliGRAM(s) Oral at bedtime  valsartan 320 milliGRAM(s) Oral daily      ALBUTerol    90 MICROgram(s) HFA Inhaler 2 Puff(s) Inhalation every 6 hours PRN  budesonide  80 MICROgram(s)/formoterol 4.5 MICROgram(s) Inhaler 2 Puff(s) Inhalation two times a day        atorvastatin 40 milliGRAM(s) Oral at bedtime    potassium chloride   Powder 20 milliEquivalent(s) Oral once      FAMILY HISTORY:  FH: heart attack        Non-contributory    SOCIAL HISTORY:    [ ] Tobacco  [ ] Drugs  [ ] Alcohol    Allergies    penicillin (Rash)  penicillins (Rash)    Intolerances    	    REVIEW OF SYSTEMS:  CONSTITUTIONAL: No fever  EYES: No eye pain, visual disturbances, or discharge  ENMT:  No difficulty hearing, tinnitus  NECK: No pain or stiffness  RESPIRATORY: No cough, + wheezing,  CARDIOVASCULAR: + chest pain, palpitations, passing out, dizziness, or leg swelling  GASTROINTESTINAL:  No nausea, vomiting, diarrhea or constipation. No melena.  GENITOURINARY: No dysuria, hematuria  NEUROLOGICAL: No stroke like symptoms  SKIN: No burning or lesions   ENDOCRINE: No heat or cold intolerance  MUSCULOSKELETAL: No joint pain or swelling  PSYCHIATRIC: No  anxiety, mood swings  HEME/LYMPH: No bleeding gums  ALLERGY AND IMMUNOLOGIC: No hives or eczema	    All other ROS negative    PHYSICAL EXAM:  T(C): 36.5 (06-25-21 @ 05:45), Max: 36.7 (06-25-21 @ 01:03)  HR: 69 (06-25-21 @ 05:45) (59 - 82)  BP: 149/85 (06-25-21 @ 05:45) (129/59 - 187/97)  RR: 17 (06-25-21 @ 05:45) (16 - 18)  SpO2: 99% (06-25-21 @ 05:45) (97% - 100%)  Wt(kg): --  I&O's Summary    24 Jun 2021 07:01  -  25 Jun 2021 07:00  --------------------------------------------------------  IN: 0 mL / OUT: 300 mL / NET: -300 mL    25 Jun 2021 07:01  -  25 Jun 2021 08:50  --------------------------------------------------------  IN: 0 mL / OUT: 400 mL / NET: -400 mL        Appearance: Normal	  HEENT:   Normal oral mucosa, EOMI	  Cardiovascular:  S1 S2, No JVD,    Respiratory: Lungs +stevie. wheezing   Psychiatry: Alert  Gastrointestinal:  Soft, Non-tender, + BS	  Skin: No rashes   Neurologic: Non-focal  Extremities:  No edema  Vascular: Peripheral pulses palpable    	    	  CARDIAC MARKERS:  Labs personally reviewed by me                          11.9   4.44  )-----------( 137      ( 25 Jun 2021 06:57 )             37.6     06-25    139  |  102  |  16  ----------------------------<  92  3.6   |  25  |  1.57<H>    Ca    9.8      25 Jun 2021 06:57  Mg     2.0     06-25    TPro  7.3  /  Alb  4.0  /  TBili  0.8  /  DBili  x   /  AST  21  /  ALT  12  /  AlkPhos  90  06-24    EKG: Personally reviewed by me -   Radiology: Personally reviewed by me -   < from: Xray Chest 2 Views PA/Lat (06.24.21 @ 17:07) >  IMPRESSION:  Clear lungs.        < end of copied text >  < from: TTE with Doppler (w/Cont) (10.07.20 @ 07:32) >  EF (Visual Estimate): 65-70 %    < end of copied text >  < from: TTE with Doppler (w/Cont) (10.07.20 @ 07:32) >  Conclusions:  1. Mitral annular calcification, otherwise normal mitral  valve. Minimal mitral regurgitation.  2. Calcified trileaflet aortic valve with normal opening.  No aortic valve regurgitation seen.  3. Endocardial visualization enhanced with intravenous  injection of Ultrasonic Enhancing Agent (Definity).  Normal  left ventricular systolic function. No segmental wall  motion abnormalities.  4. The right ventricle is not well visualized; grossly  normal right ventricular systolic function.  *** No previous Echo exam.    < end of copied text >  < from: Cardiac Cath Lab - Adult (10.07.20 @ 10:25) >  CORONARY VESSELS: The coronary circulation is right dominant.  LM:   --  Distal left main: There was a discrete 30 % stenosis.  LAD:   --  Mid LAD: There was a 80 % stenosis. In a second lesion, there  was a tubular 10 % stenosis at the site of a prior stent.  CX:   --  Ostial circumflex: There was a 30 % stenosis.  --  OM1: There was a 80 % stenosis.  RCA:   --  RCA: The vessel was small sized.  COMPLICATIONS: There were no complications.  DIAGNOSTIC RECOMMENDATIONS: 1. Long calcified disease of the prox-mid LAD  just proximal to the prior mid LAD stent.  2. Successful PCI (JONNY x 2) of the proximal to mid LAD, with LANDON 3 flow.  3. Continue DAPT and statin.  4. OM1 is a small vessel with severe ostial disease  5. Mild-moderate disease of the distal LM into ostial LCx, continue to  medically manage.  INTERVENTIONAL RECOMMENDATIONS: 1. Long calcified disease of the prox-mid  LAD just proximal to the prior mid LAD stent.  2. Successful PCI (JONNY x 2) of the proximal to mid LAD, with LANDON 3 flow.  3. Continue DAPT and statin.  4. OM1 is a small vessel with severe ostial disease  5. Mild-moderate disease of the distal LM into ostial LCx, continue to  medically manage.    < end of copied text >      Assessment /Plan:   Patient is a 75y old  Male who presents with a chief complaint of LEMON acute on chronic for several days      Problem/Plan - 1:  ·  Problem: Dyspnea on exertion.  Plan: Acute on chronic over the last few days but has been endorsing for months.Denies orthopnea   pbnp wnl   s/p 40mg IV lasix in ED   20mg IV lasix daily for now   LEMON associated with slight chest pressure. admits similar to last year prior to stent   trop 25x2  TTE 10/2020 normal LV function   repeat echo        Problem/Plan - 2:  ·  Problem: Wheezing.  Plan: -should have PFTs as outpatient  -restart symbicort, alb PRN. Hold off on steroids for now as does not appear to have acute COPD exacerbation without wheezing on exam at present.      Problem/Plan - 3:  ·  Problem: Coronary artery disease involving native heart, unspecified vessel or lesion type, unspecified whether angina present. s/p JONNY x2 to mLAD 10/2020 Plan: Resume ASA, plavix, metoprolol 50mg BID, atorvastatin 40mg qd, isosorbide dinitrate 5mg BID  pending echo        Problem/Plan - 4:  ·  Problem: Hypertension, unspecified type.  Plan:   Resume metoprolol 50mg BID, valsartan/HCTZ 320-12.5mg qd, hydral 25mg TID.  BP well controlled at this time      Problem/Plan - 5:  ·  Problem: Chronic kidney disease, unspecified CKD stage.  Plan: Creatinine at baseline  stable   continue to monitor BMP     Problem/Plan - 6:  Problem: Benign prostatic hyperplasia, unspecified whether lower urinary tract symptoms present. Plan: Continue with flomax.     Problem/Plan - 7:  ·  Problem: Need for prophylactic measure.  Plan: HSQ  DASH/TLC, 1.2L fluid restriction.         Padmini Braun ANP-C  Elio York DO [facility]  Cardiovascular Medicine  [address], [address]  Office [phone]  Cell [phone] CHIEF COMPLAINT:Patient is a 75y old  Male who presents with a chief complaint of LEMON acute on chronic for several days (24 Jun 2021 22:04)      HISTORY OF PRESENT ILLNESS:HPI:  75M with PMHx CAD s/p MI 1994 (LAD stent 2010, prox - midLAD stents x2 in 10/2020), HTN, HLD, former smoker (quit 30 yr ago, 20pack year) enlarged prostate,  CKD stage III presenting with acute on chronic LEMON. Pt was sent in from Dr. Eloy Duran's (cardiology) office with note in paper chart "Being sent to the ER with EMS for coronary angiogram. He plans to follow up in the office in two weeks". Also note states "ASHD EF 65% 10/6/20, 10/7/20 PCI/JONNY to 80% mLAD, 30% distal LM & ostial CFX, 80% small sized OM1, s/p PCI -LAD 3/16/10 endeavor. 10/2020 cath report in [facility] as well. Patient was discharged on DAPT for one year which he is taking. Patient endorses exertional chest pressure for the last 7 months but much worse over the last few days. Pressure is 10/10, sometimes radiating to left arm, relieved after 2-3 minutes of rest. Pain is worse when exerting himself and almost immediately happens when exerting. ET now down to <1 block. He also endorses LE edema which is chronic that comes and goes. Denies orthopnea or PND. Not on lasix but is on valsartan/HCTZ. He denies hx of COPD/asthma and does not take inhalers. On October admission he was noted to have wheezing and empirically started on symbicort and rec'd to f/u with pulm which does not appear to have happened. In ED found to have wheezing and given duonebs but now without wheezing. Also given 40 IV lasix. Probnp normal, CXR clear. Patient denies fevers, chills, abdominal pain, vomiting, diarrhea, unilateral leg swelling/pain. He has a chronic cough with clear sputum     (24 Jun 2021 22:04)      PAST MEDICAL & SURGICAL HISTORY:  Hypertension    Hyperlipemia    Coronary Artery Disease  treated medically    Myocardial Infarction    CKD (chronic kidney disease)    BPH (benign prostatic hyperplasia)    History of Hemorrhoidectomy            MEDICATIONS:  aspirin enteric coated 81 milliGRAM(s) Oral daily  clopidogrel Tablet 75 milliGRAM(s) Oral daily  furosemide   Injectable 20 milliGRAM(s) IV Push daily  heparin   Injectable 5000 Unit(s) SubCutaneous every 12 hours  hydrALAZINE 25 milliGRAM(s) Oral three times a day  hydrochlorothiazide 12.5 milliGRAM(s) Oral daily  isosorbide   dinitrate Tablet (ISORDIL) 5 milliGRAM(s) Oral two times a day  metoprolol tartrate 50 milliGRAM(s) Oral two times a day  tamsulosin 0.4 milliGRAM(s) Oral at bedtime  valsartan 320 milliGRAM(s) Oral daily      ALBUTerol    90 MICROgram(s) HFA Inhaler 2 Puff(s) Inhalation every 6 hours PRN  budesonide  80 MICROgram(s)/formoterol 4.5 MICROgram(s) Inhaler 2 Puff(s) Inhalation two times a day        atorvastatin 40 milliGRAM(s) Oral at bedtime    potassium chloride   Powder 20 milliEquivalent(s) Oral once      FAMILY HISTORY:  FH: heart attack        Non-contributory    SOCIAL HISTORY:    [ ] Tobacco  [ ] Drugs  [ ] Alcohol    Allergies    penicillin (Rash)  penicillins (Rash)    Intolerances    	    REVIEW OF SYSTEMS:  CONSTITUTIONAL: No fever  EYES: No eye pain, visual disturbances, or discharge  ENMT:  No difficulty hearing, tinnitus  NECK: No pain or stiffness  RESPIRATORY: No cough, + wheezing,  CARDIOVASCULAR: + chest pain, palpitations, passing out, dizziness, or leg swelling  GASTROINTESTINAL:  No nausea, vomiting, diarrhea or constipation. No melena.  GENITOURINARY: No dysuria, hematuria  NEUROLOGICAL: No stroke like symptoms  SKIN: No burning or lesions   ENDOCRINE: No heat or cold intolerance  MUSCULOSKELETAL: No joint pain or swelling  PSYCHIATRIC: No  anxiety, mood swings  HEME/LYMPH: No bleeding gums  ALLERGY AND IMMUNOLOGIC: No hives or eczema	    All other ROS negative    PHYSICAL EXAM:  T(C): 36.5 (06-25-21 @ 05:45), Max: 36.7 (06-25-21 @ 01:03)  HR: 69 (06-25-21 @ 05:45) (59 - 82)  BP: 149/85 (06-25-21 @ 05:45) (129/59 - 187/97)  RR: 17 (06-25-21 @ 05:45) (16 - 18)  SpO2: 99% (06-25-21 @ 05:45) (97% - 100%)  Wt(kg): --  I&O's Summary    24 Jun 2021 07:01  -  25 Jun 2021 07:00  --------------------------------------------------------  IN: 0 mL / OUT: 300 mL / NET: -300 mL    25 Jun 2021 07:01  -  25 Jun 2021 08:50  --------------------------------------------------------  IN: 0 mL / OUT: 400 mL / NET: -400 mL        Appearance: Normal	  HEENT:   Normal oral mucosa, EOMI	  Cardiovascular:  S1 S2, No JVD,    Respiratory: Lungs +stevie. wheezing   Psychiatry: Alert  Gastrointestinal:  Soft, Non-tender, + BS	  Skin: No rashes   Neurologic: Non-focal  Extremities:  No edema  Vascular: Peripheral pulses palpable    	    	  CARDIAC MARKERS:  Labs personally reviewed by me                          11.9   4.44  )-----------( 137      ( 25 Jun 2021 06:57 )             37.6     06-25    139  |  102  |  16  ----------------------------<  92  3.6   |  25  |  1.57<H>    Ca    9.8      25 Jun 2021 06:57  Mg     2.0     06-25    TPro  7.3  /  Alb  4.0  /  TBili  0.8  /  DBili  x   /  AST  21  /  ALT  12  /  AlkPhos  90  06-24    EKG: Personally reviewed by me -   Radiology: Personally reviewed by me -   < from: Xray Chest 2 Views PA/Lat (06.24.21 @ 17:07) >  IMPRESSION:  Clear lungs.        < end of copied text >  < from: TTE with Doppler (w/Cont) (10.07.20 @ 07:32) >  EF (Visual Estimate): 65-70 %    < end of copied text >  < from: TTE with Doppler (w/Cont) (10.07.20 @ 07:32) >  Conclusions:  1. Mitral annular calcification, otherwise normal mitral  valve. Minimal mitral regurgitation.  2. Calcified trileaflet aortic valve with normal opening.  No aortic valve regurgitation seen.  3. Endocardial visualization enhanced with intravenous  injection of Ultrasonic Enhancing Agent (Definity).  Normal  left ventricular systolic function. No segmental wall  motion abnormalities.  4. The right ventricle is not well visualized; grossly  normal right ventricular systolic function.  *** No previous Echo exam.    < end of copied text >  < from: Cardiac Cath Lab - Adult (10.07.20 @ 10:25) >  CORONARY VESSELS: The coronary circulation is right dominant.  LM:   --  Distal left main: There was a discrete 30 % stenosis.  LAD:   --  Mid LAD: There was a 80 % stenosis. In a second lesion, there  was a tubular 10 % stenosis at the site of a prior stent.  CX:   --  Ostial circumflex: There was a 30 % stenosis.  --  OM1: There was a 80 % stenosis.  RCA:   --  RCA: The vessel was small sized.  COMPLICATIONS: There were no complications.  DIAGNOSTIC RECOMMENDATIONS: 1. Long calcified disease of the prox-mid LAD  just proximal to the prior mid LAD stent.  2. Successful PCI (JONNY x 2) of the proximal to mid LAD, with LANDON 3 flow.  3. Continue DAPT and statin.  4. OM1 is a small vessel with severe ostial disease  5. Mild-moderate disease of the distal LM into ostial LCx, continue to  medically manage.  INTERVENTIONAL RECOMMENDATIONS: 1. Long calcified disease of the prox-mid  LAD just proximal to the prior mid LAD stent.  2. Successful PCI (JONNY x 2) of the proximal to mid LAD, with LANDON 3 flow.  3. Continue DAPT and statin.  4. OM1 is a small vessel with severe ostial disease  5. Mild-moderate disease of the distal LM into ostial LCx, continue to  medically manage.    < end of copied text >      Assessment /Plan:   Patient is a 75y old  Male who presents with a chief complaint of LEMON acute on chronic for several days      Problem/Plan - 1:  ·  Problem: Dyspnea on exertion.  Plan: Acute on chronic over the last few days but has been endorsing for months.Denies orthopnea   pbnp wnl   s/p 40mg IV lasix in ED   20mg IV lasix daily for now   LEMON associated with slight chest pressure. admits similar to last year prior to stent   trop 25x2  TTE 10/2020 normal LV function   repeat echo   Plan for cath Monday       Problem/Plan - 2:  ·  Problem: Wheezing.  Plan: -should have PFTs as outpatient  -restart symbicort, alb PRN. Hold off on steroids for now as does not appear to have acute COPD exacerbation without wheezing on exam at present.      Problem/Plan - 3:  ·  Problem: Coronary artery disease involving native heart, unspecified vessel or lesion type, unspecified whether angina present. s/p JONNY x2 to mLAD 10/2020 Plan: Resume ASA, plavix, metoprolol 50mg BID, atorvastatin 40mg qd, isosorbide dinitrate 5mg BID  pending echo        Problem/Plan - 4:  ·  Problem: Hypertension, unspecified type.  Plan:   Resume metoprolol 50mg BID, valsartan/HCTZ 320-12.5mg qd, hydral 25mg TID.  BP well controlled at this time      Problem/Plan - 5:  ·  Problem: Chronic kidney disease, unspecified CKD stage.  Plan: Creatinine at baseline  stable   continue to monitor BMP  Renal consult       Advanced care planning/advanced directives discussed with patient/family. DNR status including forceful chest compressions to attempt to restart the heart, ventilator support/artificial breathing, electric shock, artificial nutrition, health care proxy, Molst form all discussed with pt. Pt wishes to consider.  More than fifteen minutes spent on discussing advanced directives. OMT on six regions for acute somatic dysfunctions done at the bedside       Padmini Braun ANPMaricruzC  Elio York DO [facility]  Cardiovascular Medicine  [address] [place] [address], [address]  Office [phone]  Cell [phone]

## 2021-06-25 NOTE — CONSULT NOTE ADULT - ASSESSMENT
75y old  Male who presents with a chief complaint of LEMON acute on chronic      Dyspnea on exertion.  Plan: acute on chronic : cardiac vs pul   TTE 10/2020 normal LV function   recebnt Cath in oct with stendint to LAD   repeat echo pending   no florid CHF oin exam : would dc : defer to cardio   on exam pt w mild wheezing and ronchi B , remote hx of smoking   will check CT chest   check O2 on RA on exertion   add solumedrol   PFT as o/p      Coronary artery disease involving native heart,:  s/p JONNY x2 to mLAD 10/2020 Plan:  cont ASA, plavix, metoprolol 50mg BID, atorvastatin 40mg qd, isosorbide dinitrate 5mg BID  pending echo          Hypertension, unspecified type.  Plan:   metoprolol 50mg BID,hydral 25mg TID.  off HCTZ   on ARB: monitor Cr          Chronic kidney disease, unspecified CKD stage.  Plan: Creatinine at baseline  stable        Benign prostatic hyperplasia, unspecified whether lower urinary tract symptoms present. Plan: Continue with flomax.      75y old  Male who presents with a chief complaint of LEMON acute on chronic      Dyspnea on exertion.  Plan: acute on chronic : cardiac vs pul   TTE 10/2020 normal LV function   recebnt Cath in oct with stendint to LAD   repeat echo pending   no florid CHF oin exam : would dc : defer to cardio   on exam pt w mild wheezing and ronchi B , remote hx of smoking   will check CT chest   check O2 on RA on exertion   add solumedrol   PFT as o/p      Coronary artery disease involving native heart,:  s/p JONNY x2 to mLAD 10/2020 Plan:  cont ASA, plavix, metoprolol 50mg BID, atorvastatin 40mg qd, isosorbide dinitrate 5mg BID  pending echo          Hypertension, unspecified type.  Plan:   metoprolol 50mg BID,hydral 25mg TID.  off HCTZ   on ARB: monitor Cr          Chronic kidney disease, unspecified CKD stage.  Plan: Creatinine at baseline  stable        Benign prostatic hyperplasia, unspecified whether lower urinary tract symptoms present. Plan: Continue with flomax.     will be seeing pt as of 6/26    will  sign off   d/w Dr. York and

## 2021-06-25 NOTE — CONSULT NOTE ADULT - SUBJECTIVE AND OBJECTIVE BOX
[facility]   MD CHARI MALDONADO DO ANAM SIDDIQUI ANGELA WONG, PA        TEL:  OFFICE: [phone]  DR RUIZ CELL: [phone]  DR. MCCRACKEN CELL: [phone]  DR. ORTIZ CELL: [phone]  MANDEEP GUERRERO CELL: [phone]    From 5pm-7am answering service [phone]    --- INITIAL RENAL CONSULT NOTE ---date of service 06-25-21 @ 13:48    HPI:  75M with PMHx CAD s/p MI 1994 (LAD stent 2010, prox - midLAD stents x2 in 10/2020), HTN, HLD, former smoker (quit 30 yr ago, 20pack year) enlarged prostate,  CKD stage III presenting with acute on chronic LEMON. Patient endorses exertional chest pressure for the last 7 months but much worse over the last few days. Pressure is 10/10, sometimes radiating to left arm, relieved after 2-3 minutes of rest. He also endorses LE edema which is chronic that comes and goes. Denies orthopnea or PND. Not on lasix but is on valsartan/HCTZ. He denies hx of COPD/asthma and does not take inhalers. On October admission he was noted to have wheezing and empirically started on symbicort and rec'd to f/u with pulm which does not appear to have happened. In ED found to have wheezing and given duonebs but now without wheezing. Also given 40 IV lasix. Probnp normal, CXR clear. Patient denies fevers, chills, abdominal pain, vomiting, diarrhea, unilateral leg swelling/pain. He has a chronic cough with clear sputum  nephrology consulted for ckd        Allergies:  penicillin (Rash)  penicillins (Rash)      PAST MEDICAL & SURGICAL HISTORY:  Hypertension    Hyperlipemia    Coronary Artery Disease  treated medically    Myocardial Infarction    CKD (chronic kidney disease)    BPH (benign prostatic hyperplasia)    History of Hemorrhoidectomy        Home Medications Reviewed    Hospital Medications:   MEDICATIONS  (STANDING):  aspirin enteric coated 81 milliGRAM(s) Oral daily  atorvastatin 40 milliGRAM(s) Oral at bedtime  budesonide  80 MICROgram(s)/formoterol 4.5 MICROgram(s) Inhaler 2 Puff(s) Inhalation two times a day  clopidogrel Tablet 75 milliGRAM(s) Oral daily  furosemide   Injectable 20 milliGRAM(s) IV Push daily  heparin   Injectable 5000 Unit(s) SubCutaneous every 12 hours  hydrALAZINE 25 milliGRAM(s) Oral three times a day  hydrochlorothiazide 12.5 milliGRAM(s) Oral daily  isosorbide   dinitrate Tablet (ISORDIL) 5 milliGRAM(s) Oral two times a day  metoprolol tartrate 50 milliGRAM(s) Oral two times a day  tamsulosin 0.4 milliGRAM(s) Oral at bedtime  valsartan 320 milliGRAM(s) Oral daily      SOCIAL HISTORY:  Denies ETOh, Smoking,     FAMILY HISTORY:  FH: heart attack        REVIEW OF SYSTEMS:  CONSTITUTIONAL: No weakness, fevers or chills  EYES/ENT: No visual changes;  No vertigo or throat pain   NECK: No pain or stiffness  RESPIRATORY: No cough, wheezing, hemoptysis; No shortness of breath  CARDIOVASCULAR: No chest pain or palpitations.  GASTROINTESTINAL: No abdominal or epigastric pain. No nausea, vomiting, or hematemesis; No diarrhea or constipation. No melena or hematochezia.  GENITOURINARY: No dysuria, frequency, foamy urine, urinary urgency, incontinence or hematuria  NEUROLOGICAL: No numbness or weakness  SKIN: No itching, burning, rashes, or lesions   VASCULAR: No bilateral lower extremity edema.   All other review of systems is negative unless indicated above.    VITALS:  T(F): 98.2 (06-25-21 @ 10:00), Max: 98.2 (06-25-21 @ 10:00)  HR: 66 (06-25-21 @ 10:00)  BP: 116/- (06-25-21 @ 10:00)  RR: 18 (06-25-21 @ 10:00)  SpO2: 98% (06-25-21 @ 10:00)  Wt(kg): --    06-24 @ 07:01  -  06-25 @ 07:00  --------------------------------------------------------  IN: 0 mL / OUT: 300 mL / NET: -300 mL    06-25 @ 07:01  -  06-25 @ 13:48  --------------------------------------------------------  IN: 0 mL / OUT: 400 mL / NET: -400 mL      Height (cm): 177.8 (06-24 @ 15:13)  Weight (kg): 81.2 (06-24 @ 15:13)  BMI (kg/m2): 25.7 (06-24 @ 15:13)  BSA (m2): 1.99 (06-24 @ 15:13)    PHYSICAL EXAM:  Constitutional: NAD  HEENT: anicteric sclera, oropharynx clear, MMM  Neck: No JVD  Respiratory: CTAB, no wheezes, rales or rhonchi  Cardiovascular: S1, S2, RRR  Gastrointestinal: BS+, soft, NT/ND  Extremities: No cyanosis or clubbing. No peripheral edema  Neurological: A/O x 3, no focal deficits  Psychiatric: Normal mood, normal affect  : No CVA tenderness. No goncalves.   Skin: No rashes  Vascular Access:    LABS:  06-25    139  |  102  |  16  ----------------------------<  92  3.6   |  25  |  1.57<H>    Ca    9.8      25 Jun 2021 06:57  Mg     2.0     06-25    TPro  7.3  /  Alb  4.0  /  TBili  0.8  /  DBili      /  AST  21  /  ALT  12  /  AlkPhos  90  06-24    Creatinine Trend: 1.57 <--, 1.59 <--                        11.9   4.44  )-----------( 137      ( 25 Jun 2021 06:57 )             37.6     Urine Studies:        RADIOLOGY & ADDITIONAL STUDIES:

## 2021-06-25 NOTE — CONSULT NOTE ADULT - ASSESSMENT
75M with PMHx CAD s/p MI 1994 (LAD stent 2010, prox - midLAD stents x2 in 10/2020), HTN, HLD, former smoker (quit 30 yr ago, 20pack year) enlarged prostate,  CKD stage III presenting with acute on chronic LEMON, chest pain. nephrology consulted for ckd    CKD stage 3  baseline ~ 1.5-1.6  stable  follows up with dr. burns  continue arb/hctz  check UA  monitor  avoid nephrotoxic agents    htn  controlled  monitor    cp/sob  f/u cardio  diuresing per cardio  monitor    ckd-mbd  check pth phos level  monitor phos and calcium daily

## 2021-06-25 NOTE — CONSULT NOTE ADULT - SUBJECTIVE AND OBJECTIVE BOX
HPI:  75M with PMHx CAD s/p MI  (LAD stent , prox - midLAD stents x2 in 10/2020), HTN, HLD, former smoker (quit 30 yr ago, 20pack year) enlarged prostate,  CKD stage III presenting with acute on chronic LEMON. Pt was sent in from Dr. Eloy Duran's (cardiology) office with note in paper chart "Being sent to the ER with EMS for coronary angiogram. He plans to follow up in the office in two weeks". Also note states "ASHD EF 65% 10/6/20, 10/7/20 PCI/JONNY to 80% mLAD, 30% distal LM & ostial CFX, 80% small sized OM1, s/p PCI -LAD 3/16/10 endeavor. 10/2020 cath report in [facility] as well. Patient was discharged on DAPT for one year which he is taking. Patient endorses exertional chest pressure for the last 7 months but much worse over the last few days. Pressure is 10/10, sometimes radiating to left arm, relieved after 2-3 minutes of rest. Pain is worse when exerting himself and almost immediately happens when exerting. ET now down to <1 block. He also endorses LE edema which is chronic that comes and goes. Denies orthopnea or PND. Not on lasix but is on valsartan/HCTZ. He denies hx of COPD/asthma and does not take inhalers. On October admission he was noted to have wheezing and empirically started on symbicort and rec'd to f/u with pulm which does not appear to have happened. In ED found to have wheezing and given duonebs but now without wheezing. Also given 40 IV lasix. Probnp normal, CXR clear. Patient denies fevers, chills, abdominal pain, vomiting, diarrhea, unilateral leg swelling/pain. He has a chronic cough with clear sputum     (2021 22:04)      REVIEW OF SYSTEMS:    CONSTITUTIONAL: No weakness, fevers or chills  EYES/ENT: No visual changes;  No vertigo or throat pain   NECK: No pain or stiffness  RESPIRATORY: No cough, wheezing, hemoptysis; No shortness of breath  CARDIOVASCULAR: No chest pain or palpitations  GASTROINTESTINAL: No abdominal or epigastric pain. No nausea, vomiting, or hematemesis; No diarrhea or constipation. No melena or hematochezia.  GENITOURINARY: No dysuria, frequency or hematuria  NEUROLOGICAL: No numbness or weakness  SKIN: No itching, burning, rashes, or lesions   All other review of systems is negative unless indicated above.      Medications:   MEDICATIONS  (STANDING):  aspirin enteric coated 81 milliGRAM(s) Oral daily  atorvastatin 40 milliGRAM(s) Oral at bedtime  budesonide  80 MICROgram(s)/formoterol 4.5 MICROgram(s) Inhaler 2 Puff(s) Inhalation two times a day  clopidogrel Tablet 75 milliGRAM(s) Oral daily  furosemide   Injectable 20 milliGRAM(s) IV Push daily  heparin   Injectable 5000 Unit(s) SubCutaneous every 12 hours  hydrALAZINE 25 milliGRAM(s) Oral three times a day  isosorbide   dinitrate Tablet (ISORDIL) 5 milliGRAM(s) Oral two times a day  metoprolol tartrate 50 milliGRAM(s) Oral two times a day  tamsulosin 0.4 milliGRAM(s) Oral at bedtime  valsartan 320 milliGRAM(s) Oral daily    MEDICATIONS  (PRN):  ALBUTerol    90 MICROgram(s) HFA Inhaler 2 Puff(s) Inhalation every 6 hours PRN Wheezing      Allergies    penicillin (Rash)  penicillins (Rash)    Intolerances        PAST MEDICAL & SURGICAL HISTORY:  Hypertension    Hyperlipemia    Coronary Artery Disease  treated medically    Myocardial Infarction    CKD (chronic kidney disease)    BPH (benign prostatic hyperplasia)    History of Hemorrhoidectomy        Social :    No smoking       No ETOH use            Vital Signs Last 24 Hrs  T(C): 36.9 (2021 14:00), Max: 36.9 (2021 14:00)  T(F): 98.4 (2021 14:00), Max: 98.4 (2021 14:00)  HR: 68 (2021 14:00) (66 - 82)  BP: 140/68 (2021 14:00) (116/82 - 156/87)  BP(mean): --  RR: 17 (2021 14:00) (16 - 18)  SpO2: 98% (2021 14:00) (97% - 99%)  CAPILLARY BLOOD GLUCOSE          -24 @ 07:01  -  - @ 07:00  --------------------------------------------------------  IN: 0 mL / OUT: 300 mL / NET: -300 mL     @ 07:01  -   @ 16:03  --------------------------------------------------------  IN: 0 mL / OUT: 400 mL / NET: -400 mL        Physical Exam:    Daily     Daily Weight in k.1 (2021 02:04)  General:  Well appearing, NAD, not cachetic  HEENT:  Nonicteric, PERRLA  CV:  RRR, no murmur, no JVD  Lungs:  CTA B/L, no wheezes, rales, rhonchi  Abdomen:  Soft, non-tender, no distended, positive BS, no hepatosplenomegaly  Extremities:  2+ pulses, no c/c, no edema  Skin:  Warm and dry, no rashes  :  No goncalves  Neuro:  AAOx3, non-focal, CN II-XII grossly intact  No Restraints    LABS:                        11.9   4.44  )-----------( 137      ( 2021 06:57 )             37.6         139  |  102  |  16  ----------------------------<  92  3.6   |  25  |  1.57<H>    Ca    9.8      2021 06:57  Mg     2.0         TPro  7.3  /  Alb  4.0  /  TBili  0.8  /  DBili  x   /  AST  21  /  ALT  12  /  AlkPhos  90                  RADIOLOGY & ADDITIONAL TESTS:    ---------------------------------------------------------------------------  I personally reviewed: [  ]EKG   [  ]CXR    [  ] CT    [  ]Other  ---------------------------------------------------------------------------

## 2021-06-26 LAB
ANION GAP SERPL CALC-SCNC: 12 MMOL/L — SIGNIFICANT CHANGE UP (ref 7–14)
BUN SERPL-MCNC: 24 MG/DL — HIGH (ref 7–23)
CALCIUM SERPL-MCNC: 9.7 MG/DL — SIGNIFICANT CHANGE UP (ref 8.4–10.5)
CHLORIDE SERPL-SCNC: 102 MMOL/L — SIGNIFICANT CHANGE UP (ref 98–107)
CO2 SERPL-SCNC: 22 MMOL/L — SIGNIFICANT CHANGE UP (ref 22–31)
CREAT SERPL-MCNC: 1.96 MG/DL — HIGH (ref 0.5–1.3)
GLUCOSE SERPL-MCNC: 104 MG/DL — HIGH (ref 70–99)
HCT VFR BLD CALC: 41.3 % — SIGNIFICANT CHANGE UP (ref 39–50)
HGB BLD-MCNC: 12.9 G/DL — LOW (ref 13–17)
MAGNESIUM SERPL-MCNC: 2.1 MG/DL — SIGNIFICANT CHANGE UP (ref 1.6–2.6)
MCHC RBC-ENTMCNC: 27.9 PG — SIGNIFICANT CHANGE UP (ref 27–34)
MCHC RBC-ENTMCNC: 31.2 GM/DL — LOW (ref 32–36)
MCV RBC AUTO: 89.4 FL — SIGNIFICANT CHANGE UP (ref 80–100)
NRBC # BLD: 0 /100 WBCS — SIGNIFICANT CHANGE UP
NRBC # FLD: 0 K/UL — SIGNIFICANT CHANGE UP
PHOSPHATE SERPL-MCNC: 3.1 MG/DL — SIGNIFICANT CHANGE UP (ref 2.5–4.5)
PLATELET # BLD AUTO: 155 K/UL — SIGNIFICANT CHANGE UP (ref 150–400)
POTASSIUM SERPL-MCNC: 4.2 MMOL/L — SIGNIFICANT CHANGE UP (ref 3.5–5.3)
POTASSIUM SERPL-SCNC: 4.2 MMOL/L — SIGNIFICANT CHANGE UP (ref 3.5–5.3)
RBC # BLD: 4.62 M/UL — SIGNIFICANT CHANGE UP (ref 4.2–5.8)
RBC # FLD: 13.8 % — SIGNIFICANT CHANGE UP (ref 10.3–14.5)
SODIUM SERPL-SCNC: 136 MMOL/L — SIGNIFICANT CHANGE UP (ref 135–145)
WBC # BLD: 5.65 K/UL — SIGNIFICANT CHANGE UP (ref 3.8–10.5)
WBC # FLD AUTO: 5.65 K/UL — SIGNIFICANT CHANGE UP (ref 3.8–10.5)

## 2021-06-26 RX ADMIN — Medication 25 MILLIGRAM(S): at 13:37

## 2021-06-26 RX ADMIN — CLOPIDOGREL BISULFATE 75 MILLIGRAM(S): 75 TABLET, FILM COATED ORAL at 10:50

## 2021-06-26 RX ADMIN — Medication 25 MILLIGRAM(S): at 22:30

## 2021-06-26 RX ADMIN — ISOSORBIDE DINITRATE 5 MILLIGRAM(S): 5 TABLET ORAL at 05:47

## 2021-06-26 RX ADMIN — ATORVASTATIN CALCIUM 40 MILLIGRAM(S): 80 TABLET, FILM COATED ORAL at 22:30

## 2021-06-26 RX ADMIN — Medication 25 MILLIGRAM(S): at 05:47

## 2021-06-26 RX ADMIN — ISOSORBIDE DINITRATE 5 MILLIGRAM(S): 5 TABLET ORAL at 17:06

## 2021-06-26 RX ADMIN — BUDESONIDE AND FORMOTEROL FUMARATE DIHYDRATE 2 PUFF(S): 160; 4.5 AEROSOL RESPIRATORY (INHALATION) at 10:50

## 2021-06-26 RX ADMIN — Medication 50 MILLIGRAM(S): at 17:06

## 2021-06-26 RX ADMIN — Medication 50 MILLIGRAM(S): at 05:47

## 2021-06-26 RX ADMIN — TAMSULOSIN HYDROCHLORIDE 0.4 MILLIGRAM(S): 0.4 CAPSULE ORAL at 22:30

## 2021-06-26 RX ADMIN — HEPARIN SODIUM 5000 UNIT(S): 5000 INJECTION INTRAVENOUS; SUBCUTANEOUS at 05:47

## 2021-06-26 RX ADMIN — HEPARIN SODIUM 5000 UNIT(S): 5000 INJECTION INTRAVENOUS; SUBCUTANEOUS at 17:06

## 2021-06-26 RX ADMIN — BUDESONIDE AND FORMOTEROL FUMARATE DIHYDRATE 2 PUFF(S): 160; 4.5 AEROSOL RESPIRATORY (INHALATION) at 22:31

## 2021-06-26 RX ADMIN — Medication 81 MILLIGRAM(S): at 10:50

## 2021-06-26 RX ADMIN — Medication 20 MILLIGRAM(S): at 05:47

## 2021-06-26 NOTE — PROVIDER CONTACT NOTE (OTHER) - ASSESSMENT
[facility] [facility]- BP 93/55 HR 65, patient asymptomatic. Denies dizziness, lightheadedness.

## 2021-06-26 NOTE — PROGRESS NOTE ADULT - SUBJECTIVE AND OBJECTIVE BOX
[facility]   MD NICK MALDONADO MD RUORU WONG, PA    TEL:  OFFICE: [phone]  DR RUIZ CELL: [phone]  RAMESH GUERRERO CELL: [phone]  DR. ORTIZ CELL: [phone]  DR. MCCRACKEN CELL: [phone]    FROM 5 PM - 7 AM PLEASE CALL ANSWERING SERVICE: [phone]    RENAL FOLLOW UP NOTE--Date of Service 06-26-21 @ 11:35  --------------------------------------------------------------------------------  HPI:      Pt seen and examined at bedside.   Denies SOB, chest pain     PAST HISTORY  --------------------------------------------------------------------------------  No significant changes to PMH, PSH, FHx, SHx, unless otherwise noted    ALLERGIES & MEDICATIONS  --------------------------------------------------------------------------------  Allergies    penicillin (Rash)  penicillins (Rash)    Intolerances      Standing Inpatient Medications  aspirin enteric coated 81 milliGRAM(s) Oral daily  atorvastatin 40 milliGRAM(s) Oral at bedtime  budesonide  80 MICROgram(s)/formoterol 4.5 MICROgram(s) Inhaler 2 Puff(s) Inhalation two times a day  clopidogrel Tablet 75 milliGRAM(s) Oral daily  heparin   Injectable 5000 Unit(s) SubCutaneous every 12 hours  hydrALAZINE 25 milliGRAM(s) Oral three times a day  isosorbide   dinitrate Tablet (ISORDIL) 5 milliGRAM(s) Oral two times a day  metoprolol tartrate 50 milliGRAM(s) Oral two times a day  tamsulosin 0.4 milliGRAM(s) Oral at bedtime    PRN Inpatient Medications  ALBUTerol    90 MICROgram(s) HFA Inhaler 2 Puff(s) Inhalation every 6 hours PRN      REVIEW OF SYSTEMS  --------------------------------------------------------------------------------  General: no fever  CVS: no chest pain  RESP: no sob, no cough  ABD: no abdominal pain  : no dysuria,  MSK: no edema     VITALS/PHYSICAL EXAM  --------------------------------------------------------------------------------  T(C): 36.5 (06-26-21 @ 10:45), Max: 36.9 (06-25-21 @ 14:00)  HR: 65 (06-26-21 @ 10:45) (62 - 69)  BP: 93/55 (06-26-21 @ 10:45) (93/55 - 147/78)  RR: 17 (06-26-21 @ 10:45) (17 - 18)  SpO2: 97% (06-26-21 @ 10:45) (97% - 99%)  Wt(kg): --  Height (cm): 177.8 (06-24-21 @ 15:13)  Weight (kg): 81.2 (06-24-21 @ 15:13)  BMI (kg/m2): 25.7 (06-24-21 @ 15:13)  BSA (m2): 1.99 (06-24-21 @ 15:13)      06-25-21 @ 07:01  -  06-26-21 @ 07:00  --------------------------------------------------------  IN: 0 mL / OUT: 1600 mL / NET: -1600 mL      Physical Exam:  	Gen: NAD  	HEENT: MMM  	Pulm: CTA B/L  	CV: S1S2  	Abd: Soft, +BS  	Ext: No LE edema B/L                      Neuro: Awake   	Skin: Warm and Dry   	Vascular access: NO HD catheter             no ivanna  LABS/STUDIES  --------------------------------------------------------------------------------              12.9   5.65  >-----------<  155      [06-26-21 @ 07:34]              41.3     136  |  102  |  24  ----------------------------<  104      [06-26-21 @ 07:34]  4.2   |  22  |  1.96        Ca     9.7     [06-26-21 @ 07:34]      Mg     2.1     [06-26-21 @ 07:34]      Phos  3.1     [06-26-21 @ 07:34]    TPro  7.3  /  Alb  4.0  /  TBili  0.8  /  DBili  x   /  AST  21  /  ALT  12  /  AlkPhos  90  [06-24-21 @ 17:02]          Creatinine Trend:  SCr 1.96 [06-26 @ 07:34]  SCr 1.57 [06-25 @ 06:57]  SCr 1.59 [06-24 @ 17:02]        TSH 0.97      [06-24-21 @ 18:22]  Lipid: chol 128, TG 80, HDL 35, LDL --      [06-24-21 @ 18:22]

## 2021-06-26 NOTE — PROGRESS NOTE ADULT - SUBJECTIVE AND OBJECTIVE BOX
HPI:  75M with PMHx CAD s/p MI  (LAD stent , prox - midLAD stents x2 in 10/2020), HTN, HLD, former smoker (quit 30 yr ago, 20pack year) enlarged prostate,  CKD stage III presenting with acute on chronic LEMON. Pt was sent in from Dr. Eloy Duran's (cardiology) office with note in paper chart "Being sent to the ER with EMS for coronary angiogram. He plans to follow up in the office in two weeks". Also note states "ASHD EF 65% 10/6/20, 10/7/20 PCI/JONNY to 80% mLAD, 30% distal LM & ostial CFX, 80% small sized OM1, s/p PCI -LAD 3/16/10 endeavor. 10/2020 cath report in [facility] as well. Patient was discharged on DAPT for one year which he is taking. Patient endorses exertional chest pressure for the last 7 months but much worse over the last few days. Pressure is 10/10, sometimes radiating to left arm, relieved after 2-3 minutes of rest. Pain is worse when exerting himself and almost immediately happens when exerting. ET now down to <1 block. He also endorses LE edema which is chronic that comes and goes. Denies orthopnea or PND. Not on lasix but is on valsartan/HCTZ. He denies hx of COPD/asthma and does not take inhalers. On October admission he was noted to have wheezing and empirically started on symbicort and rec'd to f/u with pulm which does not appear to have happened. In ED found to have wheezing and given duonebs but now without wheezing. Also given 40 IV lasix. Probnp normal, CXR clear. Patient denies fevers, chills, abdominal pain, vomiting, diarrhea, unilateral leg swelling/pain. He has a chronic cough with clear sputum     (2021 22:04)  Assumiong care of patient.  Exertional dyspnea is bothersome. Scheduled for CAth on Monday    REVIEW OF SYSTEMS:    CONSTITUTIONAL: No weakness, fevers or chills  EYES/ENT: No visual changes;  No vertigo or throat pain   NECK: No pain or stiffness  RESPIRATORY: No cough, wheezing, hemoptysis; No shortness of breath  CARDIOVASCULAR: No chest pain or palpitations  GASTROINTESTINAL: No abdominal or epigastric pain. No nausea, vomiting, or hematemesis; No diarrhea or constipation. No melena or hematochezia.  GENITOURINARY: No dysuria, frequency or hematuria  NEUROLOGICAL: No numbness or weakness  SKIN: No itching, burning, rashes, or lesions   All other review of systems is negative unless indicated above.      Medications:   MEDICATIONS  (STANDING):  aspirin enteric coated 81 milliGRAM(s) Oral daily  atorvastatin 40 milliGRAM(s) Oral at bedtime  budesonide  80 MICROgram(s)/formoterol 4.5 MICROgram(s) Inhaler 2 Puff(s) Inhalation two times a day  clopidogrel Tablet 75 milliGRAM(s) Oral daily  furosemide   Injectable 20 milliGRAM(s) IV Push daily  heparin   Injectable 5000 Unit(s) SubCutaneous every 12 hours  hydrALAZINE 25 milliGRAM(s) Oral three times a day  isosorbide   dinitrate Tablet (ISORDIL) 5 milliGRAM(s) Oral two times a day  metoprolol tartrate 50 milliGRAM(s) Oral two times a day  tamsulosin 0.4 milliGRAM(s) Oral at bedtime  valsartan 320 milliGRAM(s) Oral daily    MEDICATIONS  (PRN):  ALBUTerol    90 MICROgram(s) HFA Inhaler 2 Puff(s) Inhalation every 6 hours PRN Wheezing      Allergies    penicillin (Rash)  penicillins (Rash)    Intolerances        PAST MEDICAL & SURGICAL HISTORY:  Hypertension    Hyperlipemia    Coronary Artery Disease  treated medically    Myocardial Infarction    CKD (chronic kidney disease)    BPH (benign prostatic hyperplasia)    History of Hemorrhoidectomy        Social :    No smoking       No ETOH use            Vital Signs Last 24 Hrs  T(C): 36.9 (2021 14:00), Max: 36.9 (2021 14:00)  T(F): 98.4 (2021 14:00), Max: 98.4 (2021 14:00)  HR: 68 (2021 14:00) (66 - 82)  BP: 140/68 (2021 14:00) (116/82 - 156/87)  BP(mean): --  RR: 17 (2021 14:00) (16 - 18)  SpO2: 98% (2021 14:00) (97% - 99%)  CAPILLARY BLOOD GLUCOSE          06-24 @ 07:01  -  06-25 @ 07:00  --------------------------------------------------------  IN: 0 mL / OUT: 300 mL / NET: -300 mL     @ 07:01   @ 16:03  --------------------------------------------------------  IN: 0 mL / OUT: 400 mL / NET: -400 mL        Physical Exam:    Daily     Daily Weight in k.1 (2021 02:04)  General:  Well appearing, NAD, not cachetic  HEENT:  Nonicteric, PERRLA  CV:  RRR, no murmur, no JVD  Lungs:  CTA B/L, no wheezes, rales, rhonchi  Abdomen:  Soft, non-tender, no distended, positive BS, no hepatosplenomegaly  Extremities:  2+ pulses, no c/c, no edema  Skin:  Warm and dry, no rashes  :  No goncalves  Neuro:  AAOx3, non-focal, CN II-XII grossly intact  No Restraints    LABS:                        11.9   4.44  )-----------( 137      ( 2021 06:57 )             37.6         139  |  102  |  16  ----------------------------<  92  3.6   |  25  |  1.57<H>    Ca    9.8      2021 06:57  Mg     2.0         TPro  7.3  /  Alb  4.0  /  TBili  0.8  /  DBili  x   /  AST  21  /  ALT  12  /  AlkPhos  90                  RADIOLOGY & ADDITIONAL TESTS:    ---------------------------------------------------------------------------  I personally reviewed: [  ]EKG   [  ]CXR    [  ] CT    [  ]Other  ---------------------------------------------------------------------------

## 2021-06-26 NOTE — PROGRESS NOTE ADULT - SUBJECTIVE AND OBJECTIVE BOX
Patient is a 75y old  Male who presents with a chief complaint of LEMON acute on chronic for several days (25 Jun 2021 16:01)      INTERVAL HISTORY: feels ok , plan for cath monday     TELEMETRY Personally reviewed:     REVIEW OF SYSTEMS:   CONSTITUTIONAL: No weakness  EYES/ENT: No visual changes; No throat pain  Neck: No pain or stiffness  Respiratory: No cough, wheezing, No shortness of breath  CARDIOVASCULAR: no chest pain or palpitations  GASTROINTESTINAL: No abdominal pain, no nausea, vomiting or hematemesis  GENITOURINARY: No dysuria, frequency or hematuria  NEUROLOGICAL: No stroke like symptoms  SKIN: No rashes    	  MEDICATIONS:  furosemide   Injectable 20 milliGRAM(s) IV Push daily  hydrALAZINE 25 milliGRAM(s) Oral three times a day  isosorbide   dinitrate Tablet (ISORDIL) 5 milliGRAM(s) Oral two times a day  metoprolol tartrate 50 milliGRAM(s) Oral two times a day  tamsulosin 0.4 milliGRAM(s) Oral at bedtime  valsartan 320 milliGRAM(s) Oral daily    PHYSICAL EXAM:  T(C): 36.5 (06-26-21 @ 10:45), Max: 36.9 (06-25-21 @ 14:00)  HR: 65 (06-26-21 @ 10:45) (62 - 69)  BP: 93/55 (06-26-21 @ 10:45) (93/55 - 147/78)  RR: 17 (06-26-21 @ 10:45) (17 - 18)  SpO2: 97% (06-26-21 @ 10:45) (97% - 99%)  Wt(kg): --  I&O's Summary    25 Jun 2021 07:01  -  26 Jun 2021 07:00  --------------------------------------------------------  IN: 0 mL / OUT: 1600 mL / NET: -1600 mL      Appearance: In no distress	  HEENT:    PERRL, EOMI	  Cardiovascular:  S1 S2, No JVD  Respiratory: Lungs clear to auscultation	  Gastrointestinal:  Soft, Non-tender, + BS	  Vascularature:  No edema of LE  Psychiatric: Appropriate affect   Neuro: no acute focal deficits                         12.9   5.65  )-----------( 155      ( 26 Jun 2021 07:34 )             41.3     06-26    136  |  102  |  24<H>  ----------------------------<  104<H>  4.2   |  22  |  1.96<H>    Ca    9.7      26 Jun 2021 07:34  Phos  3.1     06-26  Mg     2.1     06-26    TPro  7.3  /  Alb  4.0  /  TBili  0.8  /  DBili  x   /  AST  21  /  ALT  12  /  AlkPhos  90  06-24    Labs personally reviewed    ASSESSMENT/PLAN: 	    Patient is a 75y old  Male who presents with a chief complaint of LEMON acute on chronic for several days      Problem/Plan - 1:  ·  Problem: Dyspnea on exertion.  Plan: Acute on chronic over the last few days but has been endorsing for months.Denies orthopnea   pbnp wnl   s/p 40mg IV lasix in ED   LEMON associated with slight chest pressure. admits similar to last year prior to stent   trop 25x2  TTE 10/2020 normal LV function   pending echo   Plan for cath Monday       Problem/Plan - 2:  ·  Problem: Wheezing.  Plan: -should have PFTs as outpatient  -restart symbicort, alb PRN. Hold off on steroids for now as does not appear to have acute COPD exacerbation without wheezing on exam at present.      Problem/Plan - 3:  ·  Problem: Coronary artery disease involving native heart, unspecified vessel or lesion type, unspecified whether angina present. s/p JONNY x2 to mLAD 10/2020 Plan: Resume ASA, plavix, metoprolol 50mg BID, atorvastatin 40mg qd, isosorbide dinitrate 5mg BID  pending echo        Problem/Plan - 4:  ·  Problem: Hypertension, unspecified type.  Plan:   Resume metoprolol 50mg BID, valsartan/HCTZ 320-12.5mg qd, hydral 25mg TID.  BP well controlled at this time     HOLD valsartan and Lasix given jump in Cr      Problem/Plan - 5:  ·  Problem: Chronic kidney disease, unspecified CKD stage.  Plan: Creatinine at baseline  stable   continue to monitor BMP  Renal consult    HOLD valsartan and Lasix given jump in Cr      Padmini Braun ANP-C  Elio Javdan, DO [facility]  Cardiovascular Medicine  [address] [place] [address], [address]  Office: [phone]  Cell: [phone]

## 2021-06-27 LAB
ANION GAP SERPL CALC-SCNC: 10 MMOL/L — SIGNIFICANT CHANGE UP (ref 7–14)
BUN SERPL-MCNC: 21 MG/DL — SIGNIFICANT CHANGE UP (ref 7–23)
CALCIUM SERPL-MCNC: 9.5 MG/DL — SIGNIFICANT CHANGE UP (ref 8.4–10.5)
CHLORIDE SERPL-SCNC: 106 MMOL/L — SIGNIFICANT CHANGE UP (ref 98–107)
CO2 SERPL-SCNC: 22 MMOL/L — SIGNIFICANT CHANGE UP (ref 22–31)
CREAT ?TM UR-MCNC: 106 MG/DL — SIGNIFICANT CHANGE UP
CREAT SERPL-MCNC: 1.8 MG/DL — HIGH (ref 0.5–1.3)
GLUCOSE SERPL-MCNC: 78 MG/DL — SIGNIFICANT CHANGE UP (ref 70–99)
HCT VFR BLD CALC: 38 % — LOW (ref 39–50)
HGB BLD-MCNC: 11.9 G/DL — LOW (ref 13–17)
MAGNESIUM SERPL-MCNC: 2.2 MG/DL — SIGNIFICANT CHANGE UP (ref 1.6–2.6)
MCHC RBC-ENTMCNC: 27.8 PG — SIGNIFICANT CHANGE UP (ref 27–34)
MCHC RBC-ENTMCNC: 31.3 GM/DL — LOW (ref 32–36)
MCV RBC AUTO: 88.8 FL — SIGNIFICANT CHANGE UP (ref 80–100)
NRBC # BLD: 0 /100 WBCS — SIGNIFICANT CHANGE UP
NRBC # FLD: 0 K/UL — SIGNIFICANT CHANGE UP
PHOSPHATE SERPL-MCNC: 3.4 MG/DL — SIGNIFICANT CHANGE UP (ref 2.5–4.5)
PLATELET # BLD AUTO: 127 K/UL — LOW (ref 150–400)
POTASSIUM SERPL-MCNC: 4.4 MMOL/L — SIGNIFICANT CHANGE UP (ref 3.5–5.3)
POTASSIUM SERPL-SCNC: 4.4 MMOL/L — SIGNIFICANT CHANGE UP (ref 3.5–5.3)
RBC # BLD: 4.28 M/UL — SIGNIFICANT CHANGE UP (ref 4.2–5.8)
RBC # FLD: 14 % — SIGNIFICANT CHANGE UP (ref 10.3–14.5)
SODIUM SERPL-SCNC: 138 MMOL/L — SIGNIFICANT CHANGE UP (ref 135–145)
UUN UR-MCNC: 453.9 MG/DL — SIGNIFICANT CHANGE UP
WBC # BLD: 4.68 K/UL — SIGNIFICANT CHANGE UP (ref 3.8–10.5)
WBC # FLD AUTO: 4.68 K/UL — SIGNIFICANT CHANGE UP (ref 3.8–10.5)

## 2021-06-27 PROCEDURE — 71250 CT THORAX DX C-: CPT | Mod: 26

## 2021-06-27 RX ORDER — SODIUM BICARBONATE 1 MEQ/ML
0.05 SYRINGE (ML) INTRAVENOUS
Qty: 50 | Refills: 0 | Status: DISCONTINUED | OUTPATIENT
Start: 2021-06-28 | End: 2021-06-29

## 2021-06-27 RX ORDER — SODIUM BICARBONATE 1 MEQ/ML
0.12 SYRINGE (ML) INTRAVENOUS
Qty: 50 | Refills: 0 | Status: COMPLETED | OUTPATIENT
Start: 2021-06-28 | End: 2021-06-28

## 2021-06-27 RX ADMIN — HEPARIN SODIUM 5000 UNIT(S): 5000 INJECTION INTRAVENOUS; SUBCUTANEOUS at 05:47

## 2021-06-27 RX ADMIN — Medication 50 MILLIGRAM(S): at 21:09

## 2021-06-27 RX ADMIN — Medication 25 MILLIGRAM(S): at 21:09

## 2021-06-27 RX ADMIN — ISOSORBIDE DINITRATE 5 MILLIGRAM(S): 5 TABLET ORAL at 21:09

## 2021-06-27 RX ADMIN — ATORVASTATIN CALCIUM 40 MILLIGRAM(S): 80 TABLET, FILM COATED ORAL at 21:09

## 2021-06-27 RX ADMIN — CLOPIDOGREL BISULFATE 75 MILLIGRAM(S): 75 TABLET, FILM COATED ORAL at 09:00

## 2021-06-27 RX ADMIN — ISOSORBIDE DINITRATE 5 MILLIGRAM(S): 5 TABLET ORAL at 09:01

## 2021-06-27 RX ADMIN — BUDESONIDE AND FORMOTEROL FUMARATE DIHYDRATE 2 PUFF(S): 160; 4.5 AEROSOL RESPIRATORY (INHALATION) at 21:10

## 2021-06-27 RX ADMIN — Medication 25 MILLIGRAM(S): at 15:03

## 2021-06-27 RX ADMIN — BUDESONIDE AND FORMOTEROL FUMARATE DIHYDRATE 2 PUFF(S): 160; 4.5 AEROSOL RESPIRATORY (INHALATION) at 10:03

## 2021-06-27 RX ADMIN — Medication 25 MILLIGRAM(S): at 09:01

## 2021-06-27 RX ADMIN — TAMSULOSIN HYDROCHLORIDE 0.4 MILLIGRAM(S): 0.4 CAPSULE ORAL at 21:09

## 2021-06-27 RX ADMIN — Medication 50 MILLIGRAM(S): at 09:01

## 2021-06-27 RX ADMIN — Medication 81 MILLIGRAM(S): at 09:00

## 2021-06-27 RX ADMIN — HEPARIN SODIUM 5000 UNIT(S): 5000 INJECTION INTRAVENOUS; SUBCUTANEOUS at 16:43

## 2021-06-27 NOTE — PROGRESS NOTE ADULT - SUBJECTIVE AND OBJECTIVE BOX
DATE OF SERVICE: 21 @ 19:07  HPI:    Exertional dyspnea is bothersome. Scheduled for CAth on Monday    REVIEW OF SYSTEMS:    CONSTITUTIONAL: No weakness, fevers or chills  EYES/ENT: No visual changes;  No vertigo or throat pain   NECK: No pain or stiffness  RESPIRATORY: No cough, wheezing, hemoptysis; No shortness of breath at rest  CARDIOVASCULAR: No chest pain or palpitations  GASTROINTESTINAL: No abdominal or epigastric pain. No nausea, vomiting, or hematemesis; No diarrhea or constipation. No melena or hematochezia.  GENITOURINARY: No dysuria, frequency or hematuria  NEUROLOGICAL: No numbness or weakness  SKIN: No itching, burning, rashes, or lesions   All other review of systems is negative unless indicated above.      Medications:   MEDICATIONS  (STANDING):  aspirin enteric coated 81 milliGRAM(s) Oral daily  atorvastatin 40 milliGRAM(s) Oral at bedtime  budesonide  80 MICROgram(s)/formoterol 4.5 MICROgram(s) Inhaler 2 Puff(s) Inhalation two times a day  clopidogrel Tablet 75 milliGRAM(s) Oral daily  furosemide   Injectable 20 milliGRAM(s) IV Push daily  heparin   Injectable 5000 Unit(s) SubCutaneous every 12 hours  hydrALAZINE 25 milliGRAM(s) Oral three times a day  isosorbide   dinitrate Tablet (ISORDIL) 5 milliGRAM(s) Oral two times a day  metoprolol tartrate 50 milliGRAM(s) Oral two times a day  tamsulosin 0.4 milliGRAM(s) Oral at bedtime  valsartan 320 milliGRAM(s) Oral daily    MEDICATIONS  (PRN):  ALBUTerol    90 MICROgram(s) HFA Inhaler 2 Puff(s) Inhalation every 6 hours PRN Wheezing      Allergies    penicillin (Rash)  penicillins (Rash)    Intolerances        PAST MEDICAL & SURGICAL HISTORY:  Hypertension    Hyperlipemia    Coronary Artery Disease  treated medically    Myocardial Infarction    CKD (chronic kidney disease)    BPH (benign prostatic hyperplasia)    History of Hemorrhoidectomy        Social :    No smoking       No ETOH use            Vital Signs Last 24 Hrs  T(C): 36.9 (2021 14:00), Max: 36.9 (2021 14:00)  T(F): 98.4 (2021 14:00), Max: 98.4 (2021 14:00)  HR: 68 (2021 14:00) (66 - 82)  BP: 140/68 (2021 14:00) (116/82 - 156/87)  BP(mean): --  RR: 17 (2021 14:00) (16 - 18)  SpO2: 98% (2021 14:00) (97% - 99%)  CAPILLARY BLOOD GLUCOSE          - @ 07:01  -   @ 07:00  --------------------------------------------------------  IN: 0 mL / OUT: 300 mL / NET: -300 mL     @ 07:01  -   @ 16:03  --------------------------------------------------------  IN: 0 mL / OUT: 400 mL / NET: -400 mL        Physical Exam:    Daily     Daily Weight in k.1 (2021 02:04)  General:  Well appearing, NAD, not cachetic  HEENT:  Nonicteric, PERRLA  CV:  RRR, no murmur, no JVD  Lungs:  CTA B/L, no wheezes, rales, rhonchi  Abdomen:  Soft, non-tender, no distended, positive BS, no hepatosplenomegaly  Extremities:  2+ pulses, no c/c, no edema  Skin:  Warm and dry, no rashes  :  No goncalves  Neuro:  AAOx3, non-focal, CN II-XII grossly intact  No Restraints    LABS:                        11.9   4.44  )-----------( 137      ( 2021 06:57 )             37.6     06-25    139  |  102  |  16  ----------------------------<  92  3.6   |  25  |  1.57<H>    Ca    9.8      2021 06:57  Mg     2.0     06-25    TPro  7.3  /  Alb  4.0  /  TBili  0.8  /  DBili  x   /  AST  21  /  ALT  12  /  AlkPhos  90                  RADIOLOGY & ADDITIONAL TESTS:    ---------------------------------------------------------------------------  I personally reviewed: [  ]EKG   [  ]CXR    [  ] CT    [  ]Other  ---------------------------------------------------------------------------

## 2021-06-27 NOTE — PROGRESS NOTE ADULT - SUBJECTIVE AND OBJECTIVE BOX
Patient is a 75y old  Male who presents with a chief complaint of LEMON acute on chronic for several days (26 Jun 2021 18:01)      INTERVAL HISTORY: feels o      REVIEW OF SYSTEMS:   CONSTITUTIONAL: No weakness  EYES/ENT: No visual changes; No throat pain  Neck: No pain or stiffness  Respiratory: No cough, wheezing, No shortness of breath  CARDIOVASCULAR: no chest pain or palpitations  GASTROINTESTINAL: No abdominal pain, no nausea, vomiting or hematemesis  GENITOURINARY: No dysuria, frequency or hematuria  NEUROLOGICAL: No stroke like symptoms  SKIN: No rashes    	  MEDICATIONS:  hydrALAZINE 25 milliGRAM(s) Oral three times a day  isosorbide   dinitrate Tablet (ISORDIL) 5 milliGRAM(s) Oral two times a day  metoprolol tartrate 50 milliGRAM(s) Oral two times a day  tamsulosin 0.4 milliGRAM(s) Oral at bedtime        PHYSICAL EXAM:  T(C): 36.7 (06-27-21 @ 09:00), Max: 36.7 (06-27-21 @ 05:45)  HR: 73 (06-27-21 @ 09:00) (65 - 77)  BP: 135/99 (06-27-21 @ 09:00) (93/55 - 156/88)  RR: 16 (06-27-21 @ 09:00) (16 - 18)  SpO2: 97% (06-27-21 @ 09:00) (97% - 100%)  Wt(kg): --  I&O's Summary    26 Jun 2021 07:01  -  27 Jun 2021 07:00  --------------------------------------------------------  IN: 0 mL / OUT: 1000 mL / NET: -1000 mL    Appearance: In no distress	  HEENT:    PERRL, EOMI	  Cardiovascular:  S1 S2, No JVD  Respiratory: Lungs clear to auscultation	  Gastrointestinal:  Soft, Non-tender, + BS	  Vascularature:  No edema of LE  Psychiatric: Appropriate affect   Neuro: no acute focal deficits                         11.9   4.68  )-----------( 127      ( 27 Jun 2021 08:02 )             38.0     06-27    138  |  106  |  21  ----------------------------<  78  4.4   |  22  |  1.80<H>    Ca    9.5      27 Jun 2021 08:02  Phos  3.4     06-27  Mg     2.2     06-27    Labs personally reviewed    ASSESSMENT/PLAN: 	  Patient is a 75y old  Male who presents with a chief complaint of LEMON acute on chronic for several days      Problem/Plan - 1:  ·  Problem: Dyspnea on exertion.  Plan: Acute on chronic over the last few days but has been endorsing for months.Denies orthopnea   pbnp wnl   s/p 40mg IV lasix in ED   LEMON associated with slight chest pressure. admits similar to last year prior to stent   trop 25x2  TTE 10/2020 normal LV function   TTE 06/2021 normal LV, no WMA  Plan for cath Monday       Problem/Plan - 2:  ·  Problem: Wheezing.  Plan: -should have PFTs as outpatient  -restart symbicort, alb PRN. Hold off on steroids for now as does not appear to have acute COPD exacerbation without wheezing on exam at present.      Problem/Plan - 3:  ·  Problem: Coronary artery disease involving native heart, unspecified vessel or lesion type, unspecified whether angina present. s/p JONNY x2 to mLAD 10/2020 Plan: Resume ASA, plavix, metoprolol 50mg BID, atorvastatin 40mg qd, isosorbide dinitrate 5mg BID  TTE 06/2021 normal LV, no WMA       Problem/Plan - 4:  ·  Problem: Hypertension, unspecified type.  Plan:   Resume metoprolol 50mg BID, valsartan/HCTZ 320-12.5mg qd, hydral 25mg TID.  BP well controlled at this time     HOLD valsartan and Lasix given jump in Cr      Problem/Plan - 5:  ·  Problem: Chronic kidney disease, unspecified CKD stage.  Plan: Creatinine at baseline  stable   continue to monitor BMP  Renal consult    HOLD valsartan and Lasix given jump in Cr          Padmini York DO [facility]  Cardiovascular Medicine  [address], [address]  Office: [phone]  Cell: [phone]

## 2021-06-27 NOTE — PROGRESS NOTE ADULT - SUBJECTIVE AND OBJECTIVE BOX
[facility] [place]   MD NICK MALDONADO MD RUORU WONG, PA    TEL:  OFFICE: [phone]  DR RUIZ CELL: [phone]  RAMESH GUERRERO CELL: [phone]  DR. ORTIZ CELL: [phone]  DR. MCCRACKEN CELL: [phone]    FROM 5 PM - 7 AM PLEASE CALL ANSWERING SERVICE: [phone]    RENAL FOLLOW UP NOTE--Date of Service 06-27-21 @ 11:02  --------------------------------------------------------------------------------  HPI:      Pt seen and examined at bedside.   Denies SOB, chest pain     PAST HISTORY  --------------------------------------------------------------------------------  No significant changes to PMH, PSH, FHx, SHx, unless otherwise noted    ALLERGIES & MEDICATIONS  --------------------------------------------------------------------------------  Allergies    penicillin (Rash)  penicillins (Rash)    Intolerances      Standing Inpatient Medications  aspirin enteric coated 81 milliGRAM(s) Oral daily  atorvastatin 40 milliGRAM(s) Oral at bedtime  budesonide  80 MICROgram(s)/formoterol 4.5 MICROgram(s) Inhaler 2 Puff(s) Inhalation two times a day  clopidogrel Tablet 75 milliGRAM(s) Oral daily  heparin   Injectable 5000 Unit(s) SubCutaneous every 12 hours  hydrALAZINE 25 milliGRAM(s) Oral three times a day  isosorbide   dinitrate Tablet (ISORDIL) 5 milliGRAM(s) Oral two times a day  metoprolol tartrate 50 milliGRAM(s) Oral two times a day  tamsulosin 0.4 milliGRAM(s) Oral at bedtime    PRN Inpatient Medications  ALBUTerol    90 MICROgram(s) HFA Inhaler 2 Puff(s) Inhalation every 6 hours PRN      REVIEW OF SYSTEMS  --------------------------------------------------------------------------------  General: no fever  CVS: no chest pain  RESP: no sob, no cough  ABD: no abdominal pain  : no dysuria,  MSK: no edema     VITALS/PHYSICAL EXAM  --------------------------------------------------------------------------------  T(C): 36.7 (06-27-21 @ 09:00), Max: 36.7 (06-27-21 @ 05:45)  HR: 73 (06-27-21 @ 09:00) (67 - 77)  BP: 135/99 (06-27-21 @ 09:00) (127/69 - 156/88)  RR: 16 (06-27-21 @ 09:00) (16 - 18)  SpO2: 97% (06-27-21 @ 09:00) (97% - 100%)  Wt(kg): --        06-26-21 @ 07:01  -  06-27-21 @ 07:00  --------------------------------------------------------  IN: 0 mL / OUT: 1000 mL / NET: -1000 mL      Physical Exam:  	Gen: NAD  	HEENT: MMM  	Pulm: CTA B/L  	CV: S1S2  	Abd: Soft, +BS  	Ext: No LE edema B/L                      Neuro: Awake   	Skin: Warm and Dry   	Vascular access: NO HD catheter            no  ivanna  LABS/STUDIES  --------------------------------------------------------------------------------              11.9   4.68  >-----------<  127      [06-27-21 @ 08:02]              38.0     138  |  106  |  21  ----------------------------<  78      [06-27-21 @ 08:02]  4.4   |  22  |  1.80        Ca     9.5     [06-27-21 @ 08:02]      Mg     2.2     [06-27-21 @ 08:02]      Phos  3.4     [06-27-21 @ 08:02]            Creatinine Trend:  SCr 1.80 [06-27 @ 08:02]  SCr 1.96 [06-26 @ 07:34]  SCr 1.57 [06-25 @ 06:57]  SCr 1.59 [06-24 @ 17:02]      Urine Creatinine 106      [06-27-21 @ 06:44]  Urine Urea Nitrogen 453.9      [06-27-21 @ 06:44]    TSH 0.97      [06-24-21 @ 18:22]  Lipid: chol 128, TG 80, HDL 35, LDL --      [06-24-21 @ 18:22]

## 2021-06-28 LAB
ANION GAP SERPL CALC-SCNC: 12 MMOL/L — SIGNIFICANT CHANGE UP (ref 7–14)
BUN SERPL-MCNC: 18 MG/DL — SIGNIFICANT CHANGE UP (ref 7–23)
CALCIUM SERPL-MCNC: 9.6 MG/DL — SIGNIFICANT CHANGE UP (ref 8.4–10.5)
CHLORIDE SERPL-SCNC: 107 MMOL/L — SIGNIFICANT CHANGE UP (ref 98–107)
CO2 SERPL-SCNC: 20 MMOL/L — LOW (ref 22–31)
CREAT SERPL-MCNC: 1.51 MG/DL — HIGH (ref 0.5–1.3)
GLUCOSE SERPL-MCNC: 94 MG/DL — SIGNIFICANT CHANGE UP (ref 70–99)
HCT VFR BLD CALC: 37.8 % — LOW (ref 39–50)
HGB BLD-MCNC: 11.8 G/DL — LOW (ref 13–17)
MAGNESIUM SERPL-MCNC: 2 MG/DL — SIGNIFICANT CHANGE UP (ref 1.6–2.6)
MCHC RBC-ENTMCNC: 28.4 PG — SIGNIFICANT CHANGE UP (ref 27–34)
MCHC RBC-ENTMCNC: 31.2 GM/DL — LOW (ref 32–36)
MCV RBC AUTO: 90.9 FL — SIGNIFICANT CHANGE UP (ref 80–100)
NRBC # BLD: 0 /100 WBCS — SIGNIFICANT CHANGE UP
NRBC # FLD: 0 K/UL — SIGNIFICANT CHANGE UP
PHOSPHATE SERPL-MCNC: 3.4 MG/DL — SIGNIFICANT CHANGE UP (ref 2.5–4.5)
PLATELET # BLD AUTO: 141 K/UL — LOW (ref 150–400)
POTASSIUM SERPL-MCNC: 4.4 MMOL/L — SIGNIFICANT CHANGE UP (ref 3.5–5.3)
POTASSIUM SERPL-SCNC: 4.4 MMOL/L — SIGNIFICANT CHANGE UP (ref 3.5–5.3)
RBC # BLD: 4.16 M/UL — LOW (ref 4.2–5.8)
RBC # FLD: 14.1 % — SIGNIFICANT CHANGE UP (ref 10.3–14.5)
SODIUM SERPL-SCNC: 139 MMOL/L — SIGNIFICANT CHANGE UP (ref 135–145)
WBC # BLD: 5.16 K/UL — SIGNIFICANT CHANGE UP (ref 3.8–10.5)
WBC # FLD AUTO: 5.16 K/UL — SIGNIFICANT CHANGE UP (ref 3.8–10.5)

## 2021-06-28 PROCEDURE — 93458 L HRT ARTERY/VENTRICLE ANGIO: CPT | Mod: 26

## 2021-06-28 PROCEDURE — 99152 MOD SED SAME PHYS/QHP 5/>YRS: CPT

## 2021-06-28 PROCEDURE — 93010 ELECTROCARDIOGRAM REPORT: CPT

## 2021-06-28 PROCEDURE — 93571 IV DOP VEL&/PRESS C FLO 1ST: CPT | Mod: 26,LC

## 2021-06-28 RX ADMIN — Medication 50 MILLIGRAM(S): at 05:14

## 2021-06-28 RX ADMIN — Medication 50 MILLIGRAM(S): at 17:22

## 2021-06-28 RX ADMIN — CLOPIDOGREL BISULFATE 75 MILLIGRAM(S): 75 TABLET, FILM COATED ORAL at 09:21

## 2021-06-28 RX ADMIN — TAMSULOSIN HYDROCHLORIDE 0.4 MILLIGRAM(S): 0.4 CAPSULE ORAL at 21:03

## 2021-06-28 RX ADMIN — Medication 81 MILLIGRAM(S): at 09:21

## 2021-06-28 RX ADMIN — ISOSORBIDE DINITRATE 5 MILLIGRAM(S): 5 TABLET ORAL at 05:14

## 2021-06-28 RX ADMIN — BUDESONIDE AND FORMOTEROL FUMARATE DIHYDRATE 2 PUFF(S): 160; 4.5 AEROSOL RESPIRATORY (INHALATION) at 09:20

## 2021-06-28 RX ADMIN — Medication 75 MEQ/KG/HR: at 16:30

## 2021-06-28 RX ADMIN — ISOSORBIDE DINITRATE 5 MILLIGRAM(S): 5 TABLET ORAL at 17:21

## 2021-06-28 RX ADMIN — ATORVASTATIN CALCIUM 40 MILLIGRAM(S): 80 TABLET, FILM COATED ORAL at 21:03

## 2021-06-28 RX ADMIN — BUDESONIDE AND FORMOTEROL FUMARATE DIHYDRATE 2 PUFF(S): 160; 4.5 AEROSOL RESPIRATORY (INHALATION) at 21:03

## 2021-06-28 RX ADMIN — Medication 25 MILLIGRAM(S): at 05:14

## 2021-06-28 RX ADMIN — Medication 200 MEQ/KG/HR: at 14:01

## 2021-06-28 RX ADMIN — Medication 25 MILLIGRAM(S): at 21:03

## 2021-06-28 RX ADMIN — HEPARIN SODIUM 5000 UNIT(S): 5000 INJECTION INTRAVENOUS; SUBCUTANEOUS at 05:13

## 2021-06-28 NOTE — PROGRESS NOTE ADULT - SUBJECTIVE AND OBJECTIVE BOX
DATE OF SERVICE: 21 @ 12:43    HPI:    Exertional dyspnea is bothersome. Scheduled for CAth today    REVIEW OF SYSTEMS:    CONSTITUTIONAL: No weakness, fevers or chills  EYES/ENT: No visual changes;  No vertigo or throat pain   NECK: No pain or stiffness  RESPIRATORY: No cough, wheezing, hemoptysis; No shortness of breath at rest  CARDIOVASCULAR: No chest pain or palpitations  GASTROINTESTINAL: No abdominal or epigastric pain. No nausea, vomiting, or hematemesis; No diarrhea or constipation. No melena or hematochezia.  GENITOURINARY: No dysuria, frequency or hematuria  NEUROLOGICAL: No numbness or weakness  SKIN: No itching, burning, rashes, or lesions   All other review of systems is negative unless indicated above.      Medications:   MEDICATIONS  (STANDING):  aspirin enteric coated 81 milliGRAM(s) Oral daily  atorvastatin 40 milliGRAM(s) Oral at bedtime  budesonide  80 MICROgram(s)/formoterol 4.5 MICROgram(s) Inhaler 2 Puff(s) Inhalation two times a day  clopidogrel Tablet 75 milliGRAM(s) Oral daily  furosemide   Injectable 20 milliGRAM(s) IV Push daily  heparin   Injectable 5000 Unit(s) SubCutaneous every 12 hours  hydrALAZINE 25 milliGRAM(s) Oral three times a day  isosorbide   dinitrate Tablet (ISORDIL) 5 milliGRAM(s) Oral two times a day  metoprolol tartrate 50 milliGRAM(s) Oral two times a day  tamsulosin 0.4 milliGRAM(s) Oral at bedtime  valsartan 320 milliGRAM(s) Oral daily    MEDICATIONS  (PRN):  ALBUTerol    90 MICROgram(s) HFA Inhaler 2 Puff(s) Inhalation every 6 hours PRN Wheezing      Allergies    penicillin (Rash)  penicillins (Rash)    Intolerances        PAST MEDICAL & SURGICAL HISTORY:  Hypertension    Hyperlipemia    Coronary Artery Disease  treated medically    Myocardial Infarction    CKD (chronic kidney disease)    BPH (benign prostatic hyperplasia)    History of Hemorrhoidectomy        Social :    No smoking       No ETOH use            Vital Signs Last 24 Hrs  T(C): 36.9 (2021 14:00), Max: 36.9 (2021 14:00)  T(F): 98.4 (2021 14:00), Max: 98.4 (2021 14:00)  HR: 68 (2021 14:00) (66 - 82)  BP: 140/68 (2021 14:00) (116/82 - 156/87)  BP(mean): --  RR: 17 (2021 14:00) (16 - 18)  SpO2: 98% (2021 14:00) (97% - 99%)  CAPILLARY BLOOD GLUCOSE          -24 @ 07:01  -   @ 07:00  --------------------------------------------------------  IN: 0 mL / OUT: 300 mL / NET: -300 mL     @ 07:01  -   @ 16:03  --------------------------------------------------------  IN: 0 mL / OUT: 400 mL / NET: -400 mL        Physical Exam:    Daily     Daily Weight in k.1 (2021 02:04)  General:  Well appearing, NAD, not cachetic  HEENT:  Nonicteric, PERRLA  CV:  RRR, no murmur, no JVD  Lungs:  CTA B/L, no wheezes, rales, rhonchi  Abdomen:  Soft, non-tender, no distended, positive BS, no hepatosplenomegaly  Extremities:  2+ pulses, no c/c, no edema  Skin:  Warm and dry, no rashes  :  No goncalves  Neuro:  AAOx3, non-focal, CN II-XII grossly intact  No Restraints    LABS:                        11.9   4.44  )-----------( 137      ( 2021 06:57 )             37.6     06-25    139  |  102  |  16  ----------------------------<  92  3.6   |  25  |  1.57<H>    Ca    9.8      2021 06:57  Mg     2.0     06    TPro  7.3  /  Alb  4.0  /  TBili  0.8  /  DBili  x   /  AST  21  /  ALT  12  /  AlkPhos  90                  RADIOLOGY & ADDITIONAL TESTS:    ---------------------------------------------------------------------------  I personally reviewed: [  ]EKG   [  ]CXR    [  ] CT    [  ]Other  ---------------------------------------------------------------------------

## 2021-06-28 NOTE — PROGRESS NOTE ADULT - SUBJECTIVE AND OBJECTIVE BOX
[facility] [place] [facility]   MD CHARI MALDONADO DO ANAM SIDDIQUI ANGELA WONG, PA    TEL:  OFFICE: [phone]  DR RUIZ CELL: [phone]  DR. MCCRACKEN CELL: [phone]  DR. ORTIZ CELL: [phone]  MANDEEP GUERRERO CELL: [phone]    From 5pm-7am Answering Service [phone]    -- RENAL FOLLOW UP NOTE ---Date of Service 06-28-21 @ 13:08    Patient is a 75y old  Male who presents with a chief complaint of LEMON acute on chronic for several days (28 Jun 2021 12:41)      Patient seen and examined at bedside. No chest pain/sob    VITALS:  T(F): 97.2 (06-28-21 @ 11:51), Max: 98.3 (06-27-21 @ 15:00)  HR: 64 (06-28-21 @ 11:51)  BP: 141/73 (06-28-21 @ 11:51)  RR: 17 (06-28-21 @ 11:51)  SpO2: 99% (06-28-21 @ 11:51)  Wt(kg): --    06-27 @ 07:01  -  06-28 @ 07:00  --------------------------------------------------------  IN: 955 mL / OUT: 1125 mL / NET: -170 mL          PHYSICAL EXAM:  Constitutional: NAD  Neck: No JVD  Respiratory: CTAB, no wheezes, rales or rhonchi  Cardiovascular: S1, S2, RRR  Gastrointestinal: BS+, soft, NT/ND  Extremities: No peripheral edema    Hospital Medications:   MEDICATIONS  (STANDING):  aspirin enteric coated 81 milliGRAM(s) Oral daily  atorvastatin 40 milliGRAM(s) Oral at bedtime  budesonide  80 MICROgram(s)/formoterol 4.5 MICROgram(s) Inhaler 2 Puff(s) Inhalation two times a day  clopidogrel Tablet 75 milliGRAM(s) Oral daily  heparin   Injectable 5000 Unit(s) SubCutaneous every 12 hours  hydrALAZINE 25 milliGRAM(s) Oral three times a day  isosorbide   dinitrate Tablet (ISORDIL) 5 milliGRAM(s) Oral two times a day  metoprolol tartrate 50 milliGRAM(s) Oral two times a day  sodium bicarbonate  Infusion 0.123 mEq/kG/Hr (200 mL/Hr) IV Continuous <Continuous>  sodium bicarbonate  Infusion 0.046 mEq/kG/Hr (75 mL/Hr) IV Continuous <Continuous>  tamsulosin 0.4 milliGRAM(s) Oral at bedtime      LABS:  06-28    139  |  107  |  18  ----------------------------<  94  4.4   |  20<L>  |  1.51<H>    Ca    9.6      28 Jun 2021 07:46  Phos  3.4     06-28  Mg     2.0     06-28      Creatinine Trend: 1.51 <--, 1.80 <--, 1.96 <--, 1.57 <--, 1.59 <--    Phosphorus Level, Serum: 3.4 mg/dL (06-28 @ 07:46)                              11.8   5.16  )-----------( 141      ( 28 Jun 2021 07:42 )             37.8     Urine Studies:    Urine Creatinine 106      [06-27-21 @ 06:44]  Urine Urea Nitrogen 453.9      [06-27-21 @ 06:44]    TSH 0.97      [06-24-21 @ 18:22]  Lipid: chol 128, TG 80, HDL 35, LDL --      [06-24-21 @ 18:22]        RADIOLOGY & ADDITIONAL STUDIES:

## 2021-06-28 NOTE — CHART NOTE - NSCHARTNOTEFT_GEN_A_CORE
MARY ARROYO 75y Male s/p cath radial site check. Dressing is clear/dry/intact. Site is without hematoma or bleeding.   Patient denies pain, numbness, tingling, CP, SOB. VSS. Will continue to monitor.       Vital Signs Last 24 Hrs  T(C): 36.3 (28 Jun 2021 21:07), Max: 36.5 (28 Jun 2021 01:15)  T(F): 97.3 (28 Jun 2021 21:07), Max: 97.7 (28 Jun 2021 01:15)  HR: 66 (28 Jun 2021 21:07) (63 - 71)  BP: 123/77 (28 Jun 2021 21:07) (123/77 - 146/79)  RR: 17 (28 Jun 2021 21:07) (16 - 18)  SpO2: 97% (28 Jun 2021 21:07) (97% - 100%)  Pulses palpable, cap refill <2 sec.     Suni Ramirez PA-C  pager [phone]

## 2021-06-29 ENCOUNTER — TRANSCRIPTION ENCOUNTER (OUTPATIENT)
Age: 76
End: 2021-06-29

## 2021-06-29 VITALS
SYSTOLIC BLOOD PRESSURE: 148 MMHG | RESPIRATION RATE: 16 BRPM | DIASTOLIC BLOOD PRESSURE: 75 MMHG | HEART RATE: 65 BPM | TEMPERATURE: 98 F | OXYGEN SATURATION: 99 %

## 2021-06-29 LAB
ANION GAP SERPL CALC-SCNC: 8 MMOL/L — SIGNIFICANT CHANGE UP (ref 7–14)
BUN SERPL-MCNC: 15 MG/DL — SIGNIFICANT CHANGE UP (ref 7–23)
CALCIUM SERPL-MCNC: 8.9 MG/DL — SIGNIFICANT CHANGE UP (ref 8.4–10.5)
CHLORIDE SERPL-SCNC: 108 MMOL/L — HIGH (ref 98–107)
CO2 SERPL-SCNC: 21 MMOL/L — LOW (ref 22–31)
CREAT SERPL-MCNC: 1.46 MG/DL — HIGH (ref 0.5–1.3)
GLUCOSE SERPL-MCNC: 120 MG/DL — HIGH (ref 70–99)
HCT VFR BLD CALC: 37.6 % — LOW (ref 39–50)
HGB BLD-MCNC: 11.7 G/DL — LOW (ref 13–17)
MAGNESIUM SERPL-MCNC: 2 MG/DL — SIGNIFICANT CHANGE UP (ref 1.6–2.6)
MCHC RBC-ENTMCNC: 27.9 PG — SIGNIFICANT CHANGE UP (ref 27–34)
MCHC RBC-ENTMCNC: 31.1 GM/DL — LOW (ref 32–36)
MCV RBC AUTO: 89.5 FL — SIGNIFICANT CHANGE UP (ref 80–100)
NRBC # BLD: 0 /100 WBCS — SIGNIFICANT CHANGE UP
NRBC # FLD: 0 K/UL — SIGNIFICANT CHANGE UP
PHOSPHATE SERPL-MCNC: 3 MG/DL — SIGNIFICANT CHANGE UP (ref 2.5–4.5)
PLATELET # BLD AUTO: 142 K/UL — LOW (ref 150–400)
POTASSIUM SERPL-MCNC: 4 MMOL/L — SIGNIFICANT CHANGE UP (ref 3.5–5.3)
POTASSIUM SERPL-SCNC: 4 MMOL/L — SIGNIFICANT CHANGE UP (ref 3.5–5.3)
PTH-INTACT FLD-MCNC: 108 PG/ML — HIGH (ref 15–65)
RBC # BLD: 4.2 M/UL — SIGNIFICANT CHANGE UP (ref 4.2–5.8)
RBC # FLD: 14 % — SIGNIFICANT CHANGE UP (ref 10.3–14.5)
SODIUM SERPL-SCNC: 137 MMOL/L — SIGNIFICANT CHANGE UP (ref 135–145)
WBC # BLD: 4.83 K/UL — SIGNIFICANT CHANGE UP (ref 3.8–10.5)
WBC # FLD AUTO: 4.83 K/UL — SIGNIFICANT CHANGE UP (ref 3.8–10.5)

## 2021-06-29 RX ORDER — RANOLAZINE 500 MG/1
500 TABLET, FILM COATED, EXTENDED RELEASE ORAL
Refills: 0 | Status: DISCONTINUED | OUTPATIENT
Start: 2021-06-29 | End: 2021-06-29

## 2021-06-29 RX ORDER — RANOLAZINE 500 MG/1
1 TABLET, FILM COATED, EXTENDED RELEASE ORAL
Qty: 60 | Refills: 0
Start: 2021-06-29 | End: 2021-07-28

## 2021-06-29 RX ORDER — BUDESONIDE AND FORMOTEROL FUMARATE DIHYDRATE 160; 4.5 UG/1; UG/1
2 AEROSOL RESPIRATORY (INHALATION)
Qty: 1 | Refills: 0
Start: 2021-06-29 | End: 2021-07-28

## 2021-06-29 RX ORDER — ISOSORBIDE DINITRATE 5 MG/1
1 TABLET ORAL
Qty: 0 | Refills: 0 | DISCHARGE

## 2021-06-29 RX ADMIN — ISOSORBIDE DINITRATE 5 MILLIGRAM(S): 5 TABLET ORAL at 05:12

## 2021-06-29 RX ADMIN — Medication 25 MILLIGRAM(S): at 12:27

## 2021-06-29 RX ADMIN — CLOPIDOGREL BISULFATE 75 MILLIGRAM(S): 75 TABLET, FILM COATED ORAL at 10:01

## 2021-06-29 RX ADMIN — Medication 50 MILLIGRAM(S): at 05:12

## 2021-06-29 RX ADMIN — HEPARIN SODIUM 5000 UNIT(S): 5000 INJECTION INTRAVENOUS; SUBCUTANEOUS at 05:12

## 2021-06-29 RX ADMIN — Medication 81 MILLIGRAM(S): at 10:01

## 2021-06-29 RX ADMIN — Medication 25 MILLIGRAM(S): at 05:12

## 2021-06-29 RX ADMIN — BUDESONIDE AND FORMOTEROL FUMARATE DIHYDRATE 2 PUFF(S): 160; 4.5 AEROSOL RESPIRATORY (INHALATION) at 10:00

## 2021-06-29 NOTE — DISCHARGE NOTE PROVIDER - NSDCCPCAREPLAN_GEN_ALL_CORE_FT
PRINCIPAL DISCHARGE DIAGNOSIS  Diagnosis: CHF exacerbation  Assessment and Plan of Treatment: acute on chronic   echo 10/2020 normal LV function   recent Cath in october with stent to LAD   - LHC: LAD stent patent, LCx 60%(iFR 0.91), OM 70%(iFR 0.83), Right radial artery accessed  -if you develop leg swelling, chest pain, shortness of breath, back, arm and or jaw pain seek immediate medical attention   follow up with cardiology in 1 week after discharge      SECONDARY DISCHARGE DIAGNOSES  Diagnosis: CAD (coronary artery disease)  Assessment and Plan of Treatment: s/p drug eluding stent  x2 to mLAD 10/2020   cont ASA, plavix, metoprolol and atorvastatin  renexa started, on discharge discontinue isordil, restart valsartan, Follow up with cardiologist dr franklin on when to restart lasix  call for appointment in 1 week        Diagnosis: HTN (hypertension)  Assessment and Plan of Treatment: take your medications as prescribed  follow up with your PCP and or cardiologist in 1 week call for appointemnt   low salt diet    Diagnosis: Chronic kidney disease, unspecified CKD stage  Assessment and Plan of Treatment: CKD 3.  acute kidney injury improved   received sodium bicarbonate infusion  pre and post cath   follow up with nephrology and your PCP call for appointment       Diagnosis: BPH (benign prostatic hyperplasia)  Assessment and Plan of Treatment:    Continue with flomax.      Diagnosis: Lung nodule  Assessment and Plan of Treatment: ct chest -   IMPRESSION:  No consolidations, edema, effusion or pneumothorax.  Small cluster of nodules in the left upper lobe with areas of reticulation which is nonspecific but may be due to prior granulomatous disease.  Right lower lobe groundglass nodule measuring 0.9 cm for which the differential includes malignancy.  Bilateral bronchial wall thickening and endobronchial secretions suggestive of small airways inflammation.  If there are prior, outside CT exams of the chest they should be submitted for comparison. If none are available consider follow-up chest CT in 3 months.  follow up with your PCP upon discharge to schedule your CT

## 2021-06-29 NOTE — DISCHARGE NOTE PROVIDER - PROVIDER TOKENS
PROVIDER:[TOKEN:[[id number]]],FREE:[LAST:[PCP],PHONE:[(   )    -],FAX:[(   )    -],ADDRESS:[follow up with Dr Dana Juan  in 1 week call for appointmen t]]

## 2021-06-29 NOTE — PROGRESS NOTE ADULT - SUBJECTIVE AND OBJECTIVE BOX
Appointment scheduled for tomorrow. [facility]   MD CHARI MALDONADO DO ANAM SIDDIQUI ANGELA WONG, PA    TEL:  OFFICE: [phone]  DR RUIZ CELL: [phone]  DR. MCCRACKEN CELL: [phone]  DR. ORTIZ CELL: [phone]  MANDEEP GUERRERO CELL: [phone]    From 5pm-7am Answering Service [phone]    -- RENAL FOLLOW UP NOTE ---Date of Service 06-29-21 @ 12:42    Patient is a 75y old  Male who presents with a chief complaint of LEMON acute on chronic for several days (28 Jun 2021 13:07)      Patient seen and examined at bedside. No chest pain/sob    VITALS:  T(F): 97.6 (06-29-21 @ 11:43), Max: 97.7 (06-28-21 @ 14:00)  HR: 65 (06-29-21 @ 11:43)  BP: 148/75 (06-29-21 @ 11:43)  RR: 16 (06-29-21 @ 11:43)  SpO2: 99% (06-29-21 @ 11:43)  Wt(kg): --    06-28 @ 07:01  -  06-29 @ 07:00  --------------------------------------------------------  IN: 950 mL / OUT: 650 mL / NET: 300 mL    06-29 @ 07:01  -  06-29 @ 12:42  --------------------------------------------------------  IN: 360 mL / OUT: 400 mL / NET: -40 mL          PHYSICAL EXAM:  Constitutional: NAD  Neck: No JVD  Respiratory: CTAB, no wheezes, rales or rhonchi  Cardiovascular: S1, S2, RRR  Gastrointestinal: BS+, soft, NT/ND  Extremities: trace peripheral edema    Hospital Medications:   MEDICATIONS  (STANDING):  aspirin enteric coated 81 milliGRAM(s) Oral daily  atorvastatin 40 milliGRAM(s) Oral at bedtime  budesonide  80 MICROgram(s)/formoterol 4.5 MICROgram(s) Inhaler 2 Puff(s) Inhalation two times a day  clopidogrel Tablet 75 milliGRAM(s) Oral daily  heparin   Injectable 5000 Unit(s) SubCutaneous every 12 hours  hydrALAZINE 25 milliGRAM(s) Oral three times a day  isosorbide   dinitrate Tablet (ISORDIL) 5 milliGRAM(s) Oral two times a day  metoprolol tartrate 50 milliGRAM(s) Oral two times a day  ranolazine 500 milliGRAM(s) Oral two times a day  tamsulosin 0.4 milliGRAM(s) Oral at bedtime      LABS:  06-29    137  |  108<H>  |  15  ----------------------------<  120<H>  4.0   |  21<L>  |  1.46<H>    Ca    8.9      29 Jun 2021 07:40  Phos  3.0     06-29  Mg     2.00     06-29      Creatinine Trend: 1.46 <--, 1.51 <--, 1.80 <--, 1.96 <--, 1.57 <--, 1.59 <--    Phosphorus Level, Serum: 3.0 mg/dL (06-29 @ 07:40)    calcium--  intact [id number]  parathyroid hormone intact, serum--                            11.7   4.83  )-----------( 142      ( 29 Jun 2021 07:40 )             37.6     Urine Studies:    Urine Creatinine 106      [06-27-21 @ 06:44]  Urine Urea Nitrogen 453.9      [06-27-21 @ 06:44]    PTH -- (Ca --)      [06-29-21 @ 07:40]   108  TSH 0.97      [06-24-21 @ 18:22]  Lipid: chol 128, TG 80, HDL 35, LDL --      [06-24-21 @ 18:22]        RADIOLOGY & ADDITIONAL STUDIES:

## 2021-06-29 NOTE — PROGRESS NOTE ADULT - SUBJECTIVE AND OBJECTIVE BOX
DATE OF SERVICE: 21 @ 13:37    HPI:    S/p Cor angiogram. medical management advised    REVIEW OF SYSTEMS:    CONSTITUTIONAL: No weakness, fevers or chills  EYES/ENT: No visual changes;  No vertigo or throat pain   NECK: No pain or stiffness  RESPIRATORY: No cough, wheezing, hemoptysis; No shortness of breath at rest  CARDIOVASCULAR: No chest pain or palpitations  GASTROINTESTINAL: No abdominal or epigastric pain. No nausea, vomiting, or hematemesis; No diarrhea or constipation. No melena or hematochezia.  GENITOURINARY: No dysuria, frequency or hematuria  NEUROLOGICAL: No numbness or weakness  SKIN: No itching, burning, rashes, or lesions   All other review of systems is negative unless indicated above.      Medications:   MEDICATIONS  (STANDING):  aspirin enteric coated 81 milliGRAM(s) Oral daily  atorvastatin 40 milliGRAM(s) Oral at bedtime  budesonide  80 MICROgram(s)/formoterol 4.5 MICROgram(s) Inhaler 2 Puff(s) Inhalation two times a day  clopidogrel Tablet 75 milliGRAM(s) Oral daily  furosemide   Injectable 20 milliGRAM(s) IV Push daily  heparin   Injectable 5000 Unit(s) SubCutaneous every 12 hours  hydrALAZINE 25 milliGRAM(s) Oral three times a day  isosorbide   dinitrate Tablet (ISORDIL) 5 milliGRAM(s) Oral two times a day  metoprolol tartrate 50 milliGRAM(s) Oral two times a day  tamsulosin 0.4 milliGRAM(s) Oral at bedtime  valsartan 320 milliGRAM(s) Oral daily    MEDICATIONS  (PRN):  ALBUTerol    90 MICROgram(s) HFA Inhaler 2 Puff(s) Inhalation every 6 hours PRN Wheezing      Allergies    penicillin (Rash)  penicillins (Rash)    Intolerances        PAST MEDICAL & SURGICAL HISTORY:  Hypertension    Hyperlipemia    Coronary Artery Disease  treated medically    Myocardial Infarction    CKD (chronic kidney disease)    BPH (benign prostatic hyperplasia)    History of Hemorrhoidectomy        Social :    No smoking       No ETOH use            Vital Signs Last 24 Hrs  T(C): 36.9 (2021 14:00), Max: 36.9 (2021 14:00)  T(F): 98.4 (2021 14:00), Max: 98.4 (2021 14:00)  HR: 68 (2021 14:00) (66 - 82)  BP: 140/68 (2021 14:00) (116/82 - 156/87)  BP(mean): --  RR: 17 (2021 14:00) (16 - 18)  SpO2: 98% (2021 14:00) (97% - 99%)  CAPILLARY BLOOD GLUCOSE          - @ 07:01  -   @ 07:00  --------------------------------------------------------  IN: 0 mL / OUT: 300 mL / NET: -300 mL     @ 07:01  -   @ 16:03  --------------------------------------------------------  IN: 0 mL / OUT: 400 mL / NET: -400 mL        Physical Exam:    Daily     Daily Weight in k.1 (2021 02:04)  General:  Well appearing, NAD, not cachetic  HEENT:  Nonicteric, PERRLA  CV:  RRR, no murmur, no JVD  Lungs:  CTA B/L, no wheezes, rales, rhonchi  Abdomen:  Soft, non-tender, no distended, positive BS, no hepatosplenomegaly  Extremities:  2+ pulses, no c/c, no edema  Skin:  Warm and dry, no rashes  :  No goncalves  Neuro:  AAOx3, non-focal, CN II-XII grossly intact  No Restraints    LABS:                        11.9   4.44  )-----------( 137      ( 2021 06:57 )             37.6     06-25    139  |  102  |  16  ----------------------------<  92  3.6   |  25  |  1.57<H>    Ca    9.8      2021 06:57  Mg     2.0     06    TPro  7.3  /  Alb  4.0  /  TBili  0.8  /  DBili  x   /  AST  21  /  ALT  12  /  AlkPhos  90                  RADIOLOGY & ADDITIONAL TESTS:    ---------------------------------------------------------------------------  I personally reviewed: [  ]EKG   [  ]CXR    [  ] CT    [  ]Other  ---------------------------------------------------------------------------

## 2021-06-29 NOTE — PROGRESS NOTE ADULT - SUBJECTIVE AND OBJECTIVE BOX
CARDIOLOGY COVERING [facility]    chief complaint: LEMON    extended hpi: 75 year old male PMH CAD, remote MI, last PCI with JONNY to mL 10/2020, HTN, HLD, former tobacco abuse, BPH and CKD, who presented to ER with LEMON and chest pain.      Today denies CP, s/p [facility] 6/28, still concerned over the cause of his LEMON.    Review of Systems:   Constitutional: [ ] fevers, [ ] chills.   Skin: [ ] dry skin. [ ] rashes.  Psychiatric: [ ] depression, [ ] anxiety.   Gastrointestinal: [ ] BRBPR, [ ] melena.   Neurological: [ ] confusion. [ ] seizures. [ ] shuffling gait.   Ears,Nose,Mouth and Throat: [ ] ear pain [ ] sore throat.   Eyes: [ ] diplopia.   Respiratory: [ ] hemoptysis. [ ] shortness of breath  Cardiovascular: See HPI above  Hematologic/Lymphatic: [ ] anemia. [ ] painful nodes. [ ] prolonged bleeding.   Genitourinary: [ ] hematuria. [ ] flank pain.   Endocrine: [ ] significant change in weight. [ ] intolerance to heat and cold.     Review of systems [x ] otherwise negative, [ ] otherwise unable to obtain    FH: no family history of sudden cardiac death in first degree relatives    SH: [ ] tobacco, [ ] alcohol, [ ] drugs    ALBUTerol    90 MICROgram(s) HFA Inhaler 2 Puff(s) Inhalation every 6 hours PRN  aspirin enteric coated 81 milliGRAM(s) Oral daily  atorvastatin 40 milliGRAM(s) Oral at bedtime  budesonide  80 MICROgram(s)/formoterol 4.5 MICROgram(s) Inhaler 2 Puff(s) Inhalation two times a day  clopidogrel Tablet 75 milliGRAM(s) Oral daily  heparin   Injectable 5000 Unit(s) SubCutaneous every 12 hours  hydrALAZINE 25 milliGRAM(s) Oral three times a day  isosorbide   dinitrate Tablet (ISORDIL) 5 milliGRAM(s) Oral two times a day  metoprolol tartrate 50 milliGRAM(s) Oral two times a day  ranolazine 500 milliGRAM(s) Oral two times a day  tamsulosin 0.4 milliGRAM(s) Oral at bedtime                            11.7   4.83  )-----------( 142      ( 29 Jun 2021 07:40 )             37.6     137  |  108<H>  |  15  ----------------------------<  120<H>  4.0   |  21<L>  |  1.46<H>    Ca    8.9      29 Jun 2021 07:40  Phos  3.0     06-29  Mg     2.00     06-29      T(C): 36.4 (06-29-21 @ 11:43), Max: 36.5 (06-28-21 @ 14:00)  HR: 65 (06-29-21 @ 11:43) (63 - 69)  BP: 148/75 (06-29-21 @ 11:43) (123/77 - 148/75)  RR: 16 (06-29-21 @ 11:43) (16 - 17)  SpO2: 99% (06-29-21 @ 11:43) (97% - 100%)    General: Well nourished in no acute distress. Alert and Oriented * 3.   Head: Normocephalic and atraumatic.   Neck: No JVD. No bruits. Supple. Does not appear to be enlarged.   Cardiovascular: + S1,S2 ; RRR Soft systolic murmur at the left lower sternal border. No rubs noted.    Lungs: bibasilar rhonchi  Abdomen: + BS, soft. Non tender. Non distended. No rebound. No guarding.   Extremities: No clubbing/cyanosis/2+ pitting LE edema.   Neurologic: Moves all four extremities. Full range of motion.   Skin: Warm and moist. The patient's skin has normal elasticity and good skin turgor.   Psychiatric: Appropriate mood and affect.  Musculoskeletal: Normal range of motion, normal strength    DATA    CATH report pending    ASSESSMENT AND PLAN    75 year old male PMH CAD, remote MI, last PCI with JONNY to mLAD 10/2020, HTN, HLD, former tobacco abuse, BPH and CKD, who presented to ER with LEMON and chest pain.      --Given indet trop t and PMH, pt underwent LHC 6/28 revealing patent LAD stents and other non obs CAD by IFR  --optimize med management of CAD, ranexa was added today  --F/u cath report/f/u LVEDP  --Pt is mildly volume overloaded today, f/u with renal regarding when Diuretics are safe to resume

## 2021-06-29 NOTE — DISCHARGE NOTE PROVIDER - NSDCMRMEDTOKEN_GEN_ALL_CORE_FT
Aspirin Enteric Coated 81 mg oral delayed release tablet: 1 tab(s) orally once a day  atorvastatin 40 mg oral tablet: 1 tab(s) orally once a day  clopidogrel 75 mg oral tablet: 1 tab(s) orally once a day  hydrALAZINE 25 mg oral tablet: 1 tab(s) orally 3 times a day  isosorbide dinitrate 5 mg oral tablet: 1 tab(s) orally 2 times a day  Metoprolol Tartrate 50 mg oral tablet: 1 tab(s) orally 2 times a day  tamsulosin 0.4 mg oral capsule: 1 cap(s) orally once a day  valsartan-hydrochlorothiazide 320mg-12.5mg oral tablet: 1 tab(s) orally once a day   Aspirin Enteric Coated 81 mg oral delayed release tablet: 1 tab(s) orally once a day  atorvastatin 40 mg oral tablet: 1 tab(s) orally once a day  budesonide-formoterol 80 mcg-4.5 mcg/inh inhalation aerosol: 2 puff(s) inhaled 2 times a day   clopidogrel 75 mg oral tablet: 1 tab(s) orally once a day  hydrALAZINE 25 mg oral tablet: 1 tab(s) orally 3 times a day  Metoprolol Tartrate 50 mg oral tablet: 1 tab(s) orally 2 times a day  ranolazine 500 mg oral tablet, extended release: 1 tab(s) orally 2 times a day  tamsulosin 0.4 mg oral capsule: 1 cap(s) orally once a day  valsartan-hydrochlorothiazide 320mg-12.5mg oral tablet: 1 tab(s) orally once a day

## 2021-06-29 NOTE — PROGRESS NOTE ADULT - ATTENDING COMMENTS
Patient seen and examined.  Agree with above.   COVERING FOR DR. BURTON  -Admitted with chest pain and dyspnea, underwent cardiac cath demonstrating patent LAD stent and non-obstructive CAD that was not functionally significant by IFR - medical management of cad recommended   -Follow up official cath report  -Follow up renal to see when diuretics can safely be started    Sam Pathak MD

## 2021-06-29 NOTE — DISCHARGE NOTE PROVIDER - HOSPITAL COURSE
75y old  Male who presents with a chief complaint of LEMON acute on chronic    Dyspnea on exertion.     acute on chronic   TTE 10/2020 normal LV function   recent Cath in oct with stent to LAD   - LHC: LAD stent patent, LCx 60%(iFR 0.91), OM 70%(iFR 0.83), Right radial artery accessed       Coronary artery disease involving native heart,:  s/p JONNY x2 to mLAD 10/2020   cont ASA, plavix, metoprolol 50mg BID, atorvastatin 40mg qd,  renexa started, on discharge discontinue isordil, restart valsartan, Follow up with cardiologist dr franklin on when to restart lasix        Hypertension  home meds       Chronic kidney disease    IRENE: Improved  Pt has CKD 3  received sodium bicarbonate infusion  pre and post cath      Benign prostatic hyperplasia,    Continue with flomax.    Discussed with attending ready for discharge home  75y old  Male who presents with a chief complaint of LEMON acute on chronic    Dyspnea on exertion.     acute on chronic   TTE 10/2020 normal LV function   recent Cath in oct with stent to LAD   - LHC: LAD stent patent, LCx 60%(iFR 0.91), OM 70%(iFR 0.83), Right radial artery accessed  CT chest - IMPRESSION:  No consolidations, edema, effusion or pneumothorax.  Small cluster of nodules in the left upper lobe with areas of reticulation which is nonspecific but may be due to prior granulomatous disease.  Right lower lobe groundglass nodule measuring 0.9 cm for which the differential includes malignancy.  Bilateral bronchial wall thickening and endobronchial secretions suggestive of small airways inflammation.  If there are prior, outside CT exams of the chest they should be submitted for comparison. If none are available consider follow-up chest CT in 3 months.       Coronary artery disease involving native heart,:  s/p JONNY x2 to mLAD 10/2020   cont ASA, plavix, metoprolol 50mg BID, atorvastatin 40mg qd,  renexa started, on discharge discontinue isordil, restart valsartan, Follow up with cardiologist dr franklin on when to restart lasix        Hypertension  home meds       Chronic kidney disease    IRENE: Improved  Pt has CKD 3  received sodium bicarbonate infusion  pre and post cath      Benign prostatic hyperplasia,    Continue with flomax.    Discussed with attending ready for discharge home

## 2021-06-29 NOTE — PROGRESS NOTE ADULT - PROVIDER SPECIALTY LIST ADULT
Internal Medicine
Nephrology
Cardiology
Internal Medicine
Cardiology
Cardiology
Internal Medicine
Nephrology
Internal Medicine
Nephrology
Nephrology

## 2021-06-29 NOTE — PROGRESS NOTE ADULT - REASON FOR ADMISSION
LEMON acute on chronic for several days

## 2021-06-29 NOTE — DISCHARGE NOTE PROVIDER - CARE PROVIDER_API CALL
Nabil Hicks)  Internal Medicine  [address] [place] [address]  Phone: [phone]  Fax: [phone]  Follow Up Time:     PCP,   follow up with Dr Dana Juan  in 1 week call for chris carpenter  Phone: (   )    -  Fax: (   )    -  Follow Up Time:

## 2021-06-29 NOTE — PROGRESS NOTE ADULT - ASSESSMENT
75y old  Male who presents with a chief complaint of LEMON acute on chronic      Dyspnea on exertion.  Plan: acute on chronic : cardiac vs pul   CAD noted, needs medical management. Ranexa started,   DC planning for home and FU with out patient cardiologist     Coronary artery disease involving native heart,:  s/p JONNY x2 to mLAD 10/2020 Plan:  cont ASA, plavix, metoprolol 50mg BID, atorvastatin 40mg qd, isosorbide dinitrate 5mg BID  As above, Ranexa added         Hypertension, unspecified type.  Plan:   metoprolol 50mg BID,hydral 25mg TID.  off HCTZ   on ARB: monitor Cr          Chronic kidney disease, unspecified CKD stage.  Plan: IRENE: Improved  Pt has CKD 3       Benign prostatic hyperplasia, unspecified whether lower urinary tract symptoms present. Plan: Continue with flomax.      
 75y old  Male who presents with a chief complaint of LEMON acute on chronic      Dyspnea on exertion.  Plan: acute on chronic : cardiac vs pul   TTE 10/2020 normal LV function   recebnt Cath in oct with stendint to LAD   Cath planned for today     Coronary artery disease involving native heart,:  s/p JONNY x2 to mLAD 10/2020 Plan:  cont ASA, plavix, metoprolol 50mg BID, atorvastatin 40mg qd, isosorbide dinitrate 5mg BID  Scheduled for Cath         Hypertension, unspecified type.  Plan:   metoprolol 50mg BID,hydral 25mg TID.  off HCTZ   on ARB: monitor Cr          Chronic kidney disease, unspecified CKD stage.  Plan: IRENE: Improved  Pt has CKD 3       Benign prostatic hyperplasia, unspecified whether lower urinary tract symptoms present. Plan: Continue with flomax.      
75M with PMHx CAD s/p MI 1994 (LAD stent 2010, prox - midLAD stents x2 in 10/2020), HTN, HLD, former smoker (quit 30 yr ago, 20pack year) enlarged prostate,  CKD stage III presenting with acute on chronic LEMON, chest pain. nephrology consulted for ckd    IRENE on CKD stage 3  baseline ~ 1.5-1.6  follows up with dr. burns  Renal function worsening  etiology?  HOld lasix ( last dose on 6/26)  and ARB ( last dose on 6/25 )  Urine lytes suggestive of ATN  Renal function improving after holding lasix   Planned for cath  on Monday , recommend sodium bicarb [id number] for one hour prior to cath and 75cchr for 6 hours post cath   monitor  avoid nephrotoxic agents    htn  BP fluctuating   monitor    cp/sob  f/u cardio  Diuretics held   planned for cath on monday  monitor    ckd-mbd  check pth   monitor phos and calcium daily
75M with PMHx CAD s/p MI 1994 (LAD stent 2010, prox - midLAD stents x2 in 10/2020), HTN, HLD, former smoker (quit 30 yr ago, 20pack year) enlarged prostate,  CKD stage III presenting with acute on chronic LEMON, chest pain. nephrology consulted for ckd    IRENE on CKD stage 3  baseline ~ 1.5-1.6  follows up with dr. burns  work up suggested ATN  HOld lasix ( last dose on 6/26)  and ARB ( last dose on 6/25 )  Urine lytes suggestive of ATN  Renal function improving after holding lasix   s/p cath 6/28, s/p sodium bicarb gtt  monitor for DANIEL  avoid nephrotoxic agents    htn  BP acceptable   monitor    cp/sob  f/u cardio  Diuretics held   s/p cath 6/28  monitor    ckd-mbd  pth slightly elevated  check vit d 25  monitor phos and calcium daily
 75y old  Male who presents with a chief complaint of LEMON acute on chronic      Dyspnea on exertion.  Plan: acute on chronic : cardiac vs pul   TTE 10/2020 normal LV function   recebnt Cath in oct with stendint to LAD   Cath planned for tomorrow     Coronary artery disease involving native heart,:  s/p JONNY x2 to mLAD 10/2020 Plan:  cont ASA, plavix, metoprolol 50mg BID, atorvastatin 40mg qd, isosorbide dinitrate 5mg BID  Scheduled for Cath         Hypertension, unspecified type.  Plan:   metoprolol 50mg BID,hydral 25mg TID.  off HCTZ   on ARB: monitor Cr          Chronic kidney disease, unspecified CKD stage.  Plan: IRENE: Improved  Pt has CKD 3       Benign prostatic hyperplasia, unspecified whether lower urinary tract symptoms present. Plan: Continue with flomax.      
 75y old  Male who presents with a chief complaint of LEMON acute on chronic      Dyspnea on exertion.  Plan: acute on chronic : cardiac vs pul   TTE 10/2020 normal LV function   recebnt Cath in oct with stendint to LAD   repeat echo pending   no florid CHF oin exam :  on exam pt w mild wheezing and ronchi B , remote hx of smoking      Coronary artery disease involving native heart,:  s/p JONNY x2 to mLAD 10/2020 Plan:  cont ASA, plavix, metoprolol 50mg BID, atorvastatin 40mg qd, isosorbide dinitrate 5mg BID  Scheduled for Cath         Hypertension, unspecified type.  Plan:   metoprolol 50mg BID,hydral 25mg TID.  off HCTZ   on ARB: monitor Cr          Chronic kidney disease, unspecified CKD stage.  Plan: Creatinine at baseline  stable        Benign prostatic hyperplasia, unspecified whether lower urinary tract symptoms present. Plan: Continue with flomax.      
75M with PMHx CAD s/p MI 1994 (LAD stent 2010, prox - midLAD stents x2 in 10/2020), HTN, HLD, former smoker (quit 30 yr ago, 20pack year) enlarged prostate,  CKD stage III presenting with acute on chronic LEMON, chest pain. nephrology consulted for ckd    IRENE on CKD stage 3  baseline ~ 1.5-1.6  follows up with dr. burns  work up suggested ATN  HOld lasix ( last dose on 6/26)  and ARB ( last dose on 6/25 )  Urine lytes suggestive of ATN  Renal function improving after holding lasix   Planned for cath  today, recommend sodium bicarb [id number] for one hour prior to cath and 75cchr for 6 hours post cath. risk and benefit explained to patient, he expressed understanding  monitor  avoid nephrotoxic agents    htn  BP fluctuating   monitor    cp/sob  f/u cardio  Diuretics held   planned for cath on monday  monitor    ckd-mbd  check pth   monitor phos and calcium daily
75M with PMHx CAD s/p MI 1994 (LAD stent 2010, prox - midLAD stents x2 in 10/2020), HTN, HLD, former smoker (quit 30 yr ago, 20pack year) enlarged prostate,  CKD stage III presenting with acute on chronic LEMON, chest pain. nephrology consulted for ckd    RIENE on CKD stage 3  baseline ~ 1.5-1.6  follows up with dr. burns  Renal function worsening  etiology?  HOld lasix ( last dose on 6/26)  and ARB ( last dose on 6/25 )  check Urine lytes, and bladder scan   monitor  avoid nephrotoxic agents    htn  BP fluctuating   monitor    cp/sob  f/u cardio  Diuretics held   monitor    ckd-mbd  check pth   monitor phos and calcium daily

## 2021-06-29 NOTE — DISCHARGE NOTE NURSING/CASE MANAGEMENT/SOCIAL WORK - PATIENT PORTAL LINK FT
You can access the FollowMyHealth Patient Portal offered by [facility] by registering at the following website: [email or web address]. By joining [facility]’s FollowMyHealth portal, you will also be able to view your health information using other applications (apps) compatible with our system.

## 2021-08-02 NOTE — PATIENT PROFILE ADULT - NSPRESCRUSEDDRG_GEN_A_NUR
GENERAL: NAD, lying in bed comfortably  HEAD:  Atraumatic, Normocephalic  EYES: EOMI, PERRLA, L eye subconjunctival hemorrhage  ENT: Moist mucous membranes  CHEST/LUNG: Clear to auscultation bilaterally; No rales, rhonchi, wheezing, or rubs. Unlabored respirations  HEART: Regular rate and rhythm; No murmurs, rubs, or gallops  ABDOMEN: BSx4; Soft, nontender, nondistended  EXTREMITIES:  2+ Peripheral Pulses. No clubbing, cyanosis, or edema. Sensation intact  NERVOUS SYSTEM:  AAOx3, mild L leg weakness GENERAL: NAD, lying in bed comfortably  HEAD:  Atraumatic, Normocephalic  EYES: EOMI, PERRLA, L eye subconjunctival hemorrhage  ENT: Moist mucous membranes  CHEST/LUNG: Clear to auscultation bilaterally; No rales, rhonchi, wheezing, or rubs. Unlabored respirations  HEART: Regular rate and rhythm; No murmurs, rubs, or gallops  ABDOMEN: BSx4; Soft, nontender, nondistended  EXTREMITIES:  2+ Peripheral Pulses. No clubbing, cyanosis, or edema. Sensation intact  NERVOUS SYSTEM:  AAOx3, CN 2- 12 intact No

## 2022-01-07 NOTE — ED ADULT NURSE NOTE - CHIEF COMPLAINT QUOTE
Subjective Moderate low back pain.  Denies headache.  Denies radicular pain.    I/O's    Intake/Output Summary (Last 24 hours) at 1/7/2022 0767  Last data filed at 1/7/2022 0545  Gross per 24 hour   Intake 760 ml   Output 1645 ml   Net -885 ml       Last Recorded Vitals  Blood pressure 116/64, pulse 81, temperature 98.1 °F (36.7 °C), temperature source Oral, resp. rate 16, height 5' 8\" (1.727 m), weight 88.3 kg (194 lb 10.7 oz), SpO2 92 %.  Body mass index is 29.6 kg/m².    Physical Exam  Neurological:      Mental Status: He is alert and oriented to person, place, and time.      Sensory: Sensation is intact.      Motor: Motor function is intact.      Deep Tendon Reflexes: Reflexes are normal and symmetric.       Incision clean, dry and intact      Labs   Last LABS:    WBC (K/mcL)   Date Value   01/07/2022 11.7 (H)     RBC (mil/mcL)   Date Value   01/07/2022 2.58 (L)     HCT (%)   Date Value   01/07/2022 25.0 (L)     HGB (g/dL)   Date Value   01/07/2022 8.2 (L)     PLT (K/mcL)   Date Value   01/07/2022 179     Sodium (mmol/L)   Date Value   01/07/2022 131 (L)     Potassium (mmol/L)   Date Value   01/07/2022 3.8     Chloride (mmol/L)   Date Value   01/07/2022 96 (L)     Glucose (mg/dL)   Date Value   01/07/2022 114 (H)     Calcium (mg/dL)   Date Value   01/07/2022 9.0     Carbon Dioxide (mmol/L)   Date Value   01/07/2022 28     BUN (mg/dL)   Date Value   01/07/2022 26 (H)     Creatinine (mg/dL)   Date Value   01/07/2022 1.20 (H)           Assessment & Plan   S/p Laminectomy and fusion L3-5    Doing well  Cont PT/OT  Cont oral pain meds    DVT Prophylaxis  DVT prophylaxis with SCD's only                Patient c/o of chest pain and shortness of breath with exertion noted with lower extremity edema patient in triage denies chest pain. PMD CAD Cardiac Stents.

## 2023-08-07 NOTE — DISCHARGE NOTE PROVIDER - NSDCQMPCI_CARD_ALL_CORE
O:    Adult M lying in bed  No JVD trachea midline  S1S2+  CTA B/L  Abd soft NTND  No leg swelling/edema noted  Awake and alert  Skin pink, warm
No

## 2024-01-15 NOTE — H&P ADULT - PROBLEM SELECTOR PLAN 6
Render In Strict Bullet Format?: No Continue Regimen: Triamcinolone 0.1% cream Detail Level: Zone Continue with flomax

## 2024-12-23 NOTE — ED PROVIDER NOTE - PHYSICAL EXAMINATION
Accession #: [id number] Size Of Lesion In Cm: 2 X Size Of Lesion In Cm (Optional): 0 Size Of Margin In Cm: 1.5 Was An Eye Clamp Used?: No Eye Clamp Note Details: An eye clamp was used during the procedure. Excision Method: Elliptical Saucerization Depth: dermis and superficial adipose tissue Repair Type: Intermediate Intermediate / Complex Repair - Final Wound Length In Cm: 10.3 Suturegard Retention Suture: 2-0 Nylon Retention Suture Bite Size: 3 mm Length To Time In Minutes Device Was In Place: 10 Number Of Hemigard Strips Per Side: 1 Undermining Type: Entire Wound Debridement Text: The wound edges were debrided prior to proceeding with the closure to facilitate wound healing. Helical Rim Text: The closure involved the helical rim. Vermilion Border Text: The closure involved the vermilion border. Nostril Rim Text: The closure involved the nostril rim. Retention Suture Text: Retention sutures were placed to support the closure and prevent dehiscence. Suture Removal: 14 days Lab: -97 Gen: well developed and well nourished, NAD  CV: RRR, +S1/S2, no M/R/G  Resp: +wheezing b/l   GI: abdomen soft non-distended, NTTP  Extremities - 1+ pitting edema  Neuro: A&Ox3, following commands, speech clear, moving all four extremities spontaneously Lab Facility: [id number] Graft Donor Site Bandage (Optional-Leave Blank If You Don't Want In Note): Steri-strips and a pressure bandage were applied to the donor site. Epidermal Closure Graft Donor Site (Optional): simple interrupted Billing Type: Third-Party Bill Excision Depth: adipose tissue Scalpel Size: 15 blade Anesthesia Type: 1% lidocaine with epinephrine Additional Anesthesia Volume In Cc: 6 Hemostasis: Electrocautery Estimated Blood Loss (Cc): minimal Detail Level: Detailed Repair Depth: use same depth as excision depth Deep Sutures: 3-0 Vicryl Epidermal Sutures: 4-0 Ethilon Epidermal Closure: running horizontal mattress Wound Care: Petrolatum Dressing: dry sterile dressing Suturegard Intro: Intraoperative tissue expansion was performed, utilizing the SUTUREGARD device, in order to reduce wound tension. Suturegard Body: The suture ends were repeatedly re-tightened and re-clamped to achieve the desired tissue expansion. Hemigard Intro: Due to skin fragility and wound tension, it was decided to use HEMIGARD adhesive retention suture devices to permit a linear closure. The skin was cleaned and dried for a 6cm distance away from the wound. Excessive hair, if present, was removed to allow for adhesion. Hemigard Postcare Instructions: The HEMIGARD strips are to remain completely dry for at least 5-7 days. Positioning (Leave Blank If You Do Not Want): The patient was placed in a comfortable position exposing the surgical site. Pre-Excision Curettage Text (Leave Blank If You Do Not Want): Prior to drawing the surgical margin the visible lesion was removed with curettage to clearly define the lesion size. Complex Repair Preamble Text (Leave Blank If You Do Not Want): Extensive wide undermining was performed. Intermediate Repair Preamble Text (Leave Blank If You Do Not Want): Undermining was performed with blunt dissection. Curvilinear Excision Additional Text (Leave Blank If You Do Not Want): The margin was drawn around the clinically apparent lesion.  A curvilinear shape was then drawn on the skin incorporating the lesion and margins.  Incisions were then made along these lines to the appropriate tissue plane and the lesion was extirpated. Fusiform Excision Additional Text (Leave Blank If You Do Not Want): The margin was drawn around the clinically apparent lesion.  A fusiform shape was then drawn on the skin incorporating the lesion and margins.  Incisions were then made along these lines to the appropriate tissue plane and the lesion was extirpated. Elliptical Excision Additional Text (Leave Blank If You Do Not Want): The margin was drawn around the clinically apparent lesion.  An elliptical shape was then drawn on the skin incorporating the lesion and margins.  Incisions were then made along these lines to the appropriate tissue plane and the lesion was extirpated. Saucerization Excision Additional Text (Leave Blank If You Do Not Want): The margin was drawn around the clinically apparent lesion.  Incisions were then made along these lines, in a tangential fashion, to the appropriate tissue plane and the lesion was extirpated. Slit Excision Additional Text (Leave Blank If You Do Not Want): A linear line was drawn on the skin overlying the lesion. An incision was made slowly until the lesion was visualized.  Once visualized, the lesion was removed with blunt dissection. Excisional Biopsy Additional Text (Leave Blank If You Do Not Want): The margin was drawn around the clinically apparent lesion. An elliptical shape was then drawn on the skin incorporating the lesion and margins.  Incisions were then made along these lines to the appropriate tissue plane and the lesion was extirpated. Perilesional Excision Additional Text (Leave Blank If You Do Not Want): The margin was drawn around the clinically apparent lesion. Incisions were then made along these lines to the appropriate tissue plane and the lesion was extirpated. Repair Performed By Another Provider Text (Leave Blank If You Do Not Want): After the tissue was excised the defect was repaired by another provider. No Repair - Repaired With Adjacent Surgical Defect Text (Leave Blank If You Do Not Want): After the excision the defect was repaired concurrently with another surgical defect which was in close approximation. Adjacent Tissue Transfer Text: The defect edges were debeveled with a #15 scalpel blade. Given the location of the defect and the proximity to free margins an adjacent tissue transfer was deemed most appropriate. Using a sterile surgical marker, an appropriate flap was drawn incorporating the defect and placing the expected incisions within the relaxed skin tension lines where possible. The area thus outlined was incised deep to adipose tissue with a #15 scalpel blade. The skin margins were undermined to an appropriate distance in all directions utilizing iris scissors and carried over to close the primary defect. Advancement Flap (Single) Text: The defect edges were debeveled with a #15 scalpel blade. Given the location of the defect and the proximity to free margins a single advancement flap was deemed most appropriate. Using a sterile surgical marker, an appropriate advancement flap was drawn incorporating the defect and placing the expected incisions within the relaxed skin tension lines where possible. The area thus outlined was incised deep to adipose tissue with a #15 scalpel blade. The skin margins were undermined to an appropriate distance in all directions utilizing iris scissors. Following this, the designed flap was advanced and carried over into the primary defect and sutured into place. Advancement Flap (Double) Text: The defect edges were debeveled with a #15 scalpel blade. Given the location of the defect and the proximity to free margins a double advancement flap was deemed most appropriate. Using a sterile surgical marker, the appropriate advancement flaps were drawn incorporating the defect and placing the expected incisions within the relaxed skin tension lines where possible. The area thus outlined was incised deep to adipose tissue with a #15 scalpel blade. The skin margins were undermined to an appropriate distance in all directions utilizing iris scissors. Following this, the designed flaps were advanced and carried over into the primary defect and sutured into place. Burow's Advancement Flap Text: The defect edges were debeveled with a #15 scalpel blade. Given the location of the defect and the proximity to free margins a Burow's advancement flap was deemed most appropriate. Using a sterile surgical marker, the appropriate advancement flap was drawn incorporating the defect and placing the expected incisions within the relaxed skin tension lines where possible. The area thus outlined was incised deep to adipose tissue with a #15 scalpel blade. The skin margins were undermined to an appropriate distance in all directions utilizing iris scissors. Following this, the designed flap was advanced and carried over into the primary defect and sutured into place. Chonodrocutaneous Helical Advancement Flap Text: The defect edges were debeveled with a #15 scalpel blade. Given the location of the defect and the proximity to free margins a chondrocutaneous helical advancement flap was deemed most appropriate. Using a sterile surgical marker, the appropriate advancement flap was drawn incorporating the defect and placing the expected incisions within the relaxed skin tension lines where possible. The area thus outlined was incised deep to adipose tissue with a #15 scalpel blade. The skin margins were undermined to an appropriate distance in all directions utilizing iris scissors. Following this, the designed flap was advanced and carried over into the primary defect and sutured into place. Crescentic Advancement Flap Text: The defect edges were debeveled with a #15 scalpel blade. Given the location of the defect and the proximity to free margins a crescentic advancement flap was deemed most appropriate. Using a sterile surgical marker, the appropriate advancement flap was drawn incorporating the defect and placing the expected incisions within the relaxed skin tension lines where possible. The area thus outlined was incised deep to adipose tissue with a #15 scalpel blade. The skin margins were undermined to an appropriate distance in all directions utilizing iris scissors. Following this, the designed flap was advanced and carried over into the primary defect and sutured into place. A-T Advancement Flap Text: The defect edges were debeveled with a #15 scalpel blade. Given the location of the defect, shape of the defect and the proximity to free margins an A-T advancement flap was deemed most appropriate. Using a sterile surgical marker, an appropriate advancement flap was drawn incorporating the defect and placing the expected incisions within the relaxed skin tension lines where possible. The area thus outlined was incised deep to adipose tissue with a #15 scalpel blade. The skin margins were undermined to an appropriate distance in all directions utilizing iris scissors. Following this, the designed flap was advanced and carried over into the primary defect and sutured into place. O-T Advancement Flap Text: The defect edges were debeveled with a #15 scalpel blade. Given the location of the defect, shape of the defect and the proximity to free margins an O-T advancement flap was deemed most appropriate. Using a sterile surgical marker, an appropriate advancement flap was drawn incorporating the defect and placing the expected incisions within the relaxed skin tension lines where possible. The area thus outlined was incised deep to adipose tissue with a #15 scalpel blade. The skin margins were undermined to an appropriate distance in all directions utilizing iris scissors. Following this, the designed flap was advanced and carried over into the primary defect and sutured into place. O-L Flap Text: The defect edges were debeveled with a #15 scalpel blade. Given the location of the defect, shape of the defect and the proximity to free margins an O-L flap was deemed most appropriate. Using a sterile surgical marker, an appropriate advancement flap was drawn incorporating the defect and placing the expected incisions within the relaxed skin tension lines where possible. The area thus outlined was incised deep to adipose tissue with a #15 scalpel blade. The skin margins were undermined to an appropriate distance in all directions utilizing iris scissors. Following this, the designed flap was advanced and carried over into the primary defect and sutured into place. O-Z Flap Text: The defect edges were debeveled with a #15 scalpel blade. Given the location of the defect, shape of the defect and the proximity to free margins an O-Z flap was deemed most appropriate. Using a sterile surgical marker, an appropriate transposition flap was drawn incorporating the defect and placing the expected incisions within the relaxed skin tension lines where possible. The area thus outlined was incised deep to adipose tissue with a #15 scalpel blade. The skin margins were undermined to an appropriate distance in all directions utilizing iris scissors. Following this, the designed flap was carried over into the primary defect and sutured into place. Double O-Z Flap Text: The defect edges were debeveled with a #15 scalpel blade. Given the location of the defect, shape of the defect and the proximity to free margins a Double O-Z flap was deemed most appropriate. Using a sterile surgical marker, an appropriate transposition flap was drawn incorporating the defect and placing the expected incisions within the relaxed skin tension lines where possible. The area thus outlined was incised deep to adipose tissue with a #15 scalpel blade. The skin margins were undermined to an appropriate distance in all directions utilizing iris scissors. Following this, the designed flap was carried over into the primary defect and sutured into place. V-Y Flap Text: The defect edges were debeveled with a #15 scalpel blade. Given the location of the defect, shape of the defect and the proximity to free margins a V-Y flap was deemed most appropriate. Using a sterile surgical marker, an appropriate advancement flap was drawn incorporating the defect and placing the expected incisions within the relaxed skin tension lines where possible. The area thus outlined was incised deep to adipose tissue with a #15 scalpel blade. The skin margins were undermined to an appropriate distance in all directions utilizing iris scissors. Following this, the designed flap was advanced and carried over into the primary defect and sutured into place. Advancement-Rotation Flap Text: The defect edges were debeveled with a #15 scalpel blade. Given the location of the defect, shape of the defect and the proximity to free margins an advancement-rotation flap was deemed most appropriate. Using a sterile surgical marker, an appropriate flap was drawn incorporating the defect and placing the expected incisions within the relaxed skin tension lines where possible. The area thus outlined was incised deep to adipose tissue with a #15 scalpel blade. The skin margins were undermined to an appropriate distance in all directions utilizing iris scissors. Following this, the designed flap was carried over into the primary defect and sutured into place. Mercedes Flap Text: The defect edges were debeveled with a #15 scalpel blade. Given the location of the defect, shape of the defect and the proximity to free margins a Mercedes flap was deemed most appropriate. Using a sterile surgical marker, an appropriate advancement flap was drawn incorporating the defect and placing the expected incisions within the relaxed skin tension lines where possible. The area thus outlined was incised deep to adipose tissue with a #15 scalpel blade. The skin margins were undermined to an appropriate distance in all directions utilizing iris scissors. Following this, the designed flap was advanced and carried over into the primary defect and sutured into place. Modified Advancement Flap Text: The defect edges were debeveled with a #15 scalpel blade. Given the location of the defect, shape of the defect and the proximity to free margins a modified advancement flap was deemed most appropriate. Using a sterile surgical marker, an appropriate advancement flap was drawn incorporating the defect and placing the expected incisions within the relaxed skin tension lines where possible. The area thus outlined was incised deep to adipose tissue with a #15 scalpel blade. The skin margins were undermined to an appropriate distance in all directions utilizing iris scissors. Following this, the designed flap was advanced and carried over into the primary defect and sutured into place. Mucosal Advancement Flap Text: Given the location of the defect, shape of the defect and the proximity to free margins a mucosal advancement flap was deemed most appropriate. Incisions were made with a 15 blade scalpel in the appropriate fashion along the cutaneous vermilion border and the mucosal lip. The remaining actinically damaged mucosal tissue was excised.  The mucosal advancement flap was then elevated to the gingival sulcus with care taken to preserve the neurovascular structures and advanced into the primary defect. Care was taken to ensure that precise realignment of the vermilion border was achieved. Peng Advancement Flap Text: The defect edges were debeveled with a #15 scalpel blade. Given the location of the defect, shape of the defect and the proximity to free margins a Peng advancement flap was deemed most appropriate. Using a sterile surgical marker, an appropriate advancement flap was drawn incorporating the defect and placing the expected incisions within the relaxed skin tension lines where possible. The area thus outlined was incised deep to adipose tissue with a #15 scalpel blade. The skin margins were undermined to an appropriate distance in all directions utilizing iris scissors. Following this, the designed flap was advanced and carried over into the primary defect and sutured into place. Hatchet Flap Text: The defect edges were debeveled with a #15 scalpel blade. Given the location of the defect, shape of the defect and the proximity to free margins a hatchet flap was deemed most appropriate. Using a sterile surgical marker, an appropriate hatchet flap was drawn incorporating the defect and placing the expected incisions within the relaxed skin tension lines where possible. The area thus outlined was incised deep to adipose tissue with a #15 scalpel blade. The skin margins were undermined to an appropriate distance in all directions utilizing iris scissors. Following this, the designed flap was carried over into the primary defect and sutured into place. Rotation Flap Text: The defect edges were debeveled with a #15 scalpel blade. Given the location of the defect, shape of the defect and the proximity to free margins a rotation flap was deemed most appropriate. Using a sterile surgical marker, an appropriate rotation flap was drawn incorporating the defect and placing the expected incisions within the relaxed skin tension lines where possible. The area thus outlined was incised deep to adipose tissue with a #15 scalpel blade. The skin margins were undermined to an appropriate distance in all directions utilizing iris scissors. Following this, the designed flap was carried over into the primary defect and sutured into place. Bilateral Rotation Flap Text: The defect edges were debeveled with a #15 scalpel blade. Given the location of the defect, shape of the defect and the proximity to free margins a bilateral rotation flap was deemed most appropriate. Using a sterile surgical marker, an appropriate rotation flap was drawn incorporating the defect and placing the expected incisions within the relaxed skin tension lines where possible. The area thus outlined was incised deep to adipose tissue with a #15 scalpel blade. The skin margins were undermined to an appropriate distance in all directions utilizing iris scissors. Following this, the designed flap was carried over into the primary defect and sutured into place. Spiral Flap Text: The defect edges were debeveled with a #15 scalpel blade. Given the location of the defect, shape of the defect and the proximity to free margins a spiral flap was deemed most appropriate. Using a sterile surgical marker, an appropriate rotation flap was drawn incorporating the defect and placing the expected incisions within the relaxed skin tension lines where possible. The area thus outlined was incised deep to adipose tissue with a #15 scalpel blade. The skin margins were undermined to an appropriate distance in all directions utilizing iris scissors. Following this, the designed flap was carried over into the primary defect and sutured into place. Staged Advancement Flap Text: The defect edges were debeveled with a #15 scalpel blade. Given the location of the defect, shape of the defect and the proximity to free margins a staged advancement flap was deemed most appropriate. Using a sterile surgical marker, an appropriate advancement flap was drawn incorporating the defect and placing the expected incisions within the relaxed skin tension lines where possible. The area thus outlined was incised deep to adipose tissue with a #15 scalpel blade. The skin margins were undermined to an appropriate distance in all directions utilizing iris scissors. Following this, the designed flap was carried over into the primary defect and sutured into place. Star Wedge Flap Text: The defect edges were debeveled with a #15 scalpel blade. Given the location of the defect, shape of the defect and the proximity to free margins a star wedge flap was deemed most appropriate. Using a sterile surgical marker, an appropriate rotation flap was drawn incorporating the defect and placing the expected incisions within the relaxed skin tension lines where possible. The area thus outlined was incised deep to adipose tissue with a #15 scalpel blade. The skin margins were undermined to an appropriate distance in all directions utilizing iris scissors. Following this, the designed flap was carried over into the primary defect and sutured into place. Transposition Flap Text: The defect edges were debeveled with a #15 scalpel blade. Given the location of the defect and the proximity to free margins a transposition flap was deemed most appropriate. Using a sterile surgical marker, an appropriate transposition flap was drawn incorporating the defect. The area thus outlined was incised deep to adipose tissue with a #15 scalpel blade. The skin margins were undermined to an appropriate distance in all directions utilizing iris scissors. Following this, the designed flap was carried over into the primary defect and sutured into place. Muscle Hinge Flap Text: The defect edges were debeveled with a #15 scalpel blade.  Given the size, depth and location of the defect and the proximity to free margins a muscle hinge flap was deemed most appropriate. Using a sterile surgical marker, an appropriate hinge flap was drawn incorporating the defect. The area thus outlined was incised with a #15 scalpel blade. The skin margins were undermined to an appropriate distance in all directions utilizing iris scissors. Following this, the designed flap was carried into the primary defect and sutured into place. Mustarde Flap Text: The defect edges were debeveled with a #15 scalpel blade.  Given the size, depth and location of the defect and the proximity to free margins a Mustarde flap was deemed most appropriate. Using a sterile surgical marker, an appropriate flap was drawn incorporating the defect. The area thus outlined was incised with a #15 scalpel blade. The skin margins were undermined to an appropriate distance in all directions utilizing iris scissors. Following this, the designed flap was carried into the primary defect and sutured into place. Nasal Turnover Hinge Flap Text: The defect edges were debeveled with a #15 scalpel blade.  Given the size, depth, location of the defect and the defect being full thickness a nasal turnover hinge flap was deemed most appropriate. Using a sterile surgical marker, an appropriate hinge flap was drawn incorporating the defect. The area thus outlined was incised with a #15 scalpel blade. The flap was designed to recreate the nasal mucosal lining and the alar rim. The skin margins were undermined to an appropriate distance in all directions utilizing iris scissors. Following this, the designed flap was carried over into the primary defect and sutured into place Nasalis-Muscle-Based Myocutaneous Island Pedicle Flap Text: Using a #15 blade, an incision was made around the donor flap to the level of the nasalis muscle. Wide lateral undermining was then performed in both the subcutaneous plane above the nasalis muscle, and in a submuscular plane just above periosteum. This allowed the formation of a free nasalis muscle axial pedicle (based on the angular artery) which was still attached to the actual cutaneous flap, increasing its mobility and vascular viability. Hemostasis was obtained with pinpoint electrocoagulation. The flap was mobilized into position and the pivotal anchor points positioned and stabilized with buried interrupted sutures. Subcutaneous and dermal tissues were closed in a multilayered fashion with sutures. Tissue redundancies were excised, and the epidermal edges were apposed without significant tension and sutured with sutures. Nasalis Myocutaneous Flap Text: Using a #15 blade, an incision was made around the donor flap to the level of the nasalis muscle. Wide lateral undermining was then performed in both the subcutaneous plane above the nasalis muscle, and in a submuscular plane just above periosteum. This allowed the formation of a free nasalis muscle axial pedicle which was still attached to the actual cutaneous flap, increasing its mobility and vascular viability. Hemostasis was obtained with pinpoint electrocoagulation. The flap was mobilized into position and the pivotal anchor points positioned and stabilized with buried interrupted sutures. Subcutaneous and dermal tissues were closed in a multilayered fashion with sutures. Tissue redundancies were excised, and the epidermal edges were apposed without significant tension and sutured with sutures. Nasolabial Transposition Flap Text: The defect edges were debeveled with a #15 scalpel blade.  Given the size, depth and location of the defect and the proximity to free margins a nasolabial transposition flap was deemed most appropriate. Using a sterile surgical marker, an appropriate flap was drawn incorporating the defect. The area thus outlined was incised with a #15 scalpel blade. The skin margins were undermined to an appropriate distance in all directions utilizing iris scissors. Following this, the designed flap was carried into the primary defect and sutured into place. Orbicularis Oris Muscle Flap Text: The defect edges were debeveled with a #15 scalpel blade.  Given that the defect affected the competency of the oral sphincter an orbicularis oris muscle flap was deemed most appropriate to restore this competency and normal muscle function.  Using a sterile surgical marker, an appropriate flap was drawn incorporating the defect. The area thus outlined was incised with a #15 scalpel blade. Following this, the designed flap was carried over into the primary defect and sutured into place. Melolabial Transposition Flap Text: The defect edges were debeveled with a #15 scalpel blade. Given the location of the defect and the proximity to free margins a melolabial flap was deemed most appropriate. Using a sterile surgical marker, an appropriate melolabial transposition flap was drawn incorporating the defect. The area thus outlined was incised deep to adipose tissue with a #15 scalpel blade. The skin margins were undermined to an appropriate distance in all directions utilizing iris scissors. Following this, the designed flap was carried over into the primary defect and sutured into place. Rectangular Flap Text: The defect edges were debeveled with a #15 scalpel blade. Given the location of the defect and the proximity to free margins a rectangular flap was deemed most appropriate. Using a sterile surgical marker, an appropriate rectangular flap was drawn incorporating the defect. The area thus outlined was incised deep to adipose tissue with a #15 scalpel blade. The skin margins were undermined to an appropriate distance in all directions utilizing iris scissors. Following this, the designed flap was carried over into the primary defect and sutured into place. Rhombic Flap Text: The defect edges were debeveled with a #15 scalpel blade. Given the location of the defect and the proximity to free margins a rhombic flap was deemed most appropriate. Using a sterile surgical marker, an appropriate rhombic flap was drawn incorporating the defect. The area thus outlined was incised deep to adipose tissue with a #15 scalpel blade. The skin margins were undermined to an appropriate distance in all directions utilizing iris scissors. Following this, the designed flap was carried over into the primary defect and sutured into place. Rhomboid Transposition Flap Text: The defect edges were debeveled with a #15 scalpel blade. Given the location of the defect and the proximity to free margins a rhomboid transposition flap was deemed most appropriate. Using a sterile surgical marker, an appropriate rhomboid flap was drawn incorporating the defect. The area thus outlined was incised deep to adipose tissue with a #15 scalpel blade. The skin margins were undermined to an appropriate distance in all directions utilizing iris scissors. Following this, the designed flap was carried over into the primary defect and sutured into place. Bi-Rhombic Flap Text: The defect edges were debeveled with a #15 scalpel blade. Given the location of the defect and the proximity to free margins a bi-rhombic flap was deemed most appropriate. Using a sterile surgical marker, an appropriate rhombic flap was drawn incorporating the defect. The area thus outlined was incised deep to adipose tissue with a #15 scalpel blade. The skin margins were undermined to an appropriate distance in all directions utilizing iris scissors. Following this, the designed flap was carried over into the primary defect and sutured into place. Helical Rim Advancement Flap Text: The defect edges were debeveled with a #15 blade scalpel.  Given the location of the defect and the proximity to free margins (helical rim) a double helical rim advancement flap was deemed most appropriate. Using a sterile surgical marker, the appropriate advancement flaps were drawn incorporating the defect and placing the expected incisions between the helical rim and antihelix where possible.  The area thus outlined was incised through and through with a #15 scalpel blade.  With a skin hook and iris scissors, the flaps were gently and sharply undermined and freed up. Folllowing this, the designed flaps were carried over into the primary defect and sutured into place. Bilateral Helical Rim Advancement Flap Text: The defect edges were debeveled with a #15 blade scalpel.  Given the location of the defect and the proximity to free margins (helical rim) a bilateral helical rim advancement flap was deemed most appropriate. Using a sterile surgical marker, the appropriate advancement flaps were drawn incorporating the defect and placing the expected incisions between the helical rim and antihelix where possible.  The area thus outlined was incised through and through with a #15 scalpel blade.  With a skin hook and iris scissors, the flaps were gently and sharply undermined and freed up. Following this, the designed flaps were placed into the primary defect and sutured into place. Ear Star Wedge Flap Text: The defect edges were debeveled with a #15 blade scalpel.  Given the location of the defect and the proximity to free margins (helical rim) an ear star wedge flap was deemed most appropriate. Using a sterile surgical marker, the appropriate flap was drawn incorporating the defect and placing the expected incisions between the helical rim and antihelix where possible.  The area thus outlined was incised through and through with a #15 scalpel blade. Following this, the designed flap was carried over into the primary defect and sutured into place. Flip-Flop Flap Text: The defect edges were debeveled with a #15 blade scalpel.  Given the location of the defect and the proximity to free margins a flip-flop flap was deemed most appropriate. Using a sterile surgical marker, the appropriate flap was drawn incorporating the defect and placing the expected incisions between the helical rim and antihelix where possible.  The area thus outlined was incised through and through with a #15 scalpel blade. Following this, the designed flap was carried over into the primary defect and sutured into place. Banner Transposition Flap Text: The defect edges were debeveled with a #15 scalpel blade. Given the location of the defect and the proximity to free margins a Banner transposition flap was deemed most appropriate. Using a sterile surgical marker, an appropriate flap was drawn around the defect. The area thus outlined was incised deep to adipose tissue with a #15 scalpel blade. The skin margins were undermined to an appropriate distance in all directions utilizing iris scissors. Following this, the designed flap was carried into the primary defect and sutured into place. Bilobed Flap Text: The defect edges were debeveled with a #15 scalpel blade. Given the location of the defect and the proximity to free margins a bilobe flap was deemed most appropriate. Using a sterile surgical marker, an appropriate bilobe flap drawn around the defect. The area thus outlined was incised deep to adipose tissue with a #15 scalpel blade. The skin margins were undermined to an appropriate distance in all directions utilizing iris scissors. Following this, the designed flap was carried over into the primary defect and sutured into place. Bilobed Transposition Flap Text: The defect edges were debeveled with a #15 scalpel blade. Given the location of the defect and the proximity to free margins a bilobed transposition flap was deemed most appropriate. Using a sterile surgical marker, an appropriate bilobe flap drawn around the defect. The area thus outlined was incised deep to adipose tissue with a #15 scalpel blade. The skin margins were undermined to an appropriate distance in all directions utilizing iris scissors. Following this, the designed flap was carried over into the primary defect and sutured into place. Trilobed Flap Text: The defect edges were debeveled with a #15 scalpel blade. Given the location of the defect and the proximity to free margins a trilobed flap was deemed most appropriate. Using a sterile surgical marker, an appropriate trilobed flap was drawn around the defect. The area thus outlined was incised deep to adipose tissue with a #15 scalpel blade. The skin margins were undermined to an appropriate distance in all directions utilizing iris scissors. Following this, the designed flap was carried into the primary defect and sutured into place. Dorsal Nasal Flap Text: The defect edges were debeveled with a #15 scalpel blade. Given the location of the defect and the proximity to free margins a dorsal nasal flap was deemed most appropriate. Using a sterile surgical marker, an appropriate dorsal nasal flap was drawn around the defect. The area thus outlined was incised deep to adipose tissue with a #15 scalpel blade. The skin margins were undermined to an appropriate distance in all directions utilizing iris scissors. Following this, the designed flap was carried into the primary defect and sutured into place. Island Pedicle Flap Text: The defect edges were debeveled with a #15 scalpel blade. Given the location of the defect, shape of the defect and the proximity to free margins an island pedicle advancement flap was deemed most appropriate. Using a sterile surgical marker, an appropriate advancement flap was drawn incorporating the defect, outlining the appropriate donor tissue and placing the expected incisions within the relaxed skin tension lines where possible. The area thus outlined was incised deep to adipose tissue with a #15 scalpel blade. The skin margins were undermined to an appropriate distance in all directions around the primary defect and laterally outward around the island pedicle utilizing iris scissors.  There was minimal undermining beneath the pedicle flap. Following this, the flap was carried over into the primary defect and sutured into place. Island Pedicle Flap With Canthal Suspension Text: The defect edges were debeveled with a #15 scalpel blade. Given the location of the defect, shape of the defect and the proximity to free margins an island pedicle advancement flap was deemed most appropriate. Using a sterile surgical marker, an appropriate advancement flap was drawn incorporating the defect, outlining the appropriate donor tissue and placing the expected incisions within the relaxed skin tension lines where possible. The area thus outlined was incised deep to adipose tissue with a #15 scalpel blade. The skin margins were undermined to an appropriate distance in all directions around the primary defect and laterally outward around the island pedicle utilizing iris scissors.  There was minimal undermining beneath the pedicle flap. A suspension suture was placed in the canthal tendon to prevent tension and prevent ectropion. Following this, the designed flap was placed into the primary defect and sutured into place. Alar Island Pedicle Flap Text: The defect edges were debeveled with a #15 scalpel blade. Given the location of the defect, shape of the defect and the proximity to the alar rim an island pedicle advancement flap was deemed most appropriate. Using a sterile surgical marker, an appropriate advancement flap was drawn incorporating the defect, outlining the appropriate donor tissue and placing the expected incisions within the nasal ala running parallel to the alar rim. The area thus outlined was incised with a #15 scalpel blade. The skin margins were undermined minimally to an appropriate distance in all directions around the primary defect and laterally outward around the island pedicle utilizing iris scissors.  There was minimal undermining beneath the pedicle flap. Following this, the designed flap was carried over into the primary defect and sutured into place. Double Island Pedicle Flap Text: The defect edges were debeveled with a #15 scalpel blade. Given the location of the defect, shape of the defect and the proximity to free margins a double island pedicle advancement flap was deemed most appropriate. Using a sterile surgical marker, an appropriate advancement flap was drawn incorporating the defect, outlining the appropriate donor tissue and placing the expected incisions within the relaxed skin tension lines where possible. The area thus outlined was incised deep to adipose tissue with a #15 scalpel blade. The skin margins were undermined to an appropriate distance in all directions around the primary defect and laterally outward around the island pedicle utilizing iris scissors.  There was minimal undermining beneath the pedicle flap. Following this, the flap was carried over into the primary defect and sutured into place. Island Pedicle Flap-Requiring Vessel Identification Text: The defect edges were debeveled with a #15 scalpel blade. Given the location of the defect, shape of the defect and the proximity to free margins an island pedicle advancement flap was deemed most appropriate. Using a sterile surgical marker, an appropriate advancement flap was drawn, based on the axial vessel mentioned above, incorporating the defect, outlining the appropriate donor tissue and placing the expected incisions within the relaxed skin tension lines where possible. The area thus outlined was incised deep to adipose tissue with a #15 scalpel blade. The skin margins were undermined to an appropriate distance in all directions around the primary defect and laterally outward around the island pedicle utilizing iris scissors.  There was minimal undermining beneath the pedicle flap. Following this, the designed flap was carried over into the primary defect and sutured into place. Keystone Flap Text: The defect edges were debeveled with a #15 scalpel blade. Given the location of the defect, shape of the defect a keystone flap was deemed most appropriate. Using a sterile surgical marker, an appropriate keystone flap was drawn incorporating the defect, outlining the appropriate donor tissue and placing the expected incisions within the relaxed skin tension lines where possible. The area thus outlined was incised deep to adipose tissue with a #15 scalpel blade. The skin margins were undermined to an appropriate distance in all directions around the primary defect and laterally outward around the flap utilizing iris scissors. Following this, the designed flap was carried into the primary defect and sutured into place. O-T Plasty Text: The defect edges were debeveled with a #15 scalpel blade. Given the location of the defect, shape of the defect and the proximity to free margins an O-T plasty was deemed most appropriate. Using a sterile surgical marker, an appropriate O-T plasty was drawn incorporating the defect and placing the expected incisions within the relaxed skin tension lines where possible. The area thus outlined was incised deep to adipose tissue with a #15 scalpel blade. The skin margins were undermined to an appropriate distance in all directions utilizing iris scissors. Following this, the designed flap was carried over into the primary defect and sutured into place. O-Z Plasty Text: The defect edges were debeveled with a #15 scalpel blade. Given the location of the defect, shape of the defect and the proximity to free margins an O-Z plasty (double transposition flap) was deemed most appropriate. Using a sterile surgical marker, the appropriate transposition flaps were drawn incorporating the defect and placing the expected incisions within the relaxed skin tension lines where possible. The area thus outlined was incised deep to adipose tissue with a #15 scalpel blade. The skin margins were undermined to an appropriate distance in all directions utilizing iris scissors. Hemostasis was achieved with electrocautery. The flaps were then transposed and carried over into place, one clockwise and the other counterclockwise, and anchored with interrupted buried subcutaneous sutures. Double O-Z Plasty Text: The defect edges were debeveled with a #15 scalpel blade. Given the location of the defect, shape of the defect and the proximity to free margins a Double O-Z plasty (double transposition flap) was deemed most appropriate. Using a sterile surgical marker, the appropriate transposition flaps were drawn incorporating the defect and placing the expected incisions within the relaxed skin tension lines where possible. The area thus outlined was incised deep to adipose tissue with a #15 scalpel blade. The skin margins were undermined to an appropriate distance in all directions utilizing iris scissors. Hemostasis was achieved with electrocautery. The flaps were then transposed and carried over into place, one clockwise and the other counterclockwise, and anchored with interrupted buried subcutaneous sutures. V-Y Plasty Text: The defect edges were debeveled with a #15 scalpel blade. Given the location of the defect, shape of the defect and the proximity to free margins an V-Y advancement flap was deemed most appropriate. Using a sterile surgical marker, an appropriate advancement flap was drawn incorporating the defect and placing the expected incisions within the relaxed skin tension lines where possible. The area thus outlined was incised deep to adipose tissue with a #15 scalpel blade. The skin margins were undermined to an appropriate distance in all directions utilizing iris scissors. Following this, the designed flap was advanced and carried over into the primary defect and sutured into place. H Plasty Text: Given the location of the defect, shape of the defect and the proximity to free margins a H-plasty was deemed most appropriate for repair. Using a sterile surgical marker, the appropriate advancement arms of the H-plasty were drawn incorporating the defect and placing the expected incisions within the relaxed skin tension lines where possible. The area thus outlined was incised deep to adipose tissue with a #15 scalpel blade. The skin margins were undermined to an appropriate distance in all directions utilizing iris scissors.  The opposing advancement arms were then advanced and carried over into place in opposite direction and anchored with interrupted buried subcutaneous sutures. W Plasty Text: The lesion was extirpated to the level of the fat with a #15 scalpel blade. Given the location of the defect, shape of the defect and the proximity to free margins a W-plasty was deemed most appropriate for repair. Using a sterile surgical marker, the appropriate transposition arms of the W-plasty were drawn incorporating the defect and placing the expected incisions within the relaxed skin tension lines where possible. The area thus outlined was incised deep to adipose tissue with a #15 scalpel blade. The skin margins were undermined to an appropriate distance in all directions utilizing iris scissors. The opposing transposition arms were then transposed and carried over into place in opposite direction and anchored with interrupted buried subcutaneous sutures. Z Plasty Text: The lesion was extirpated to the level of the fat with a #15 scalpel blade. Given the location of the defect, shape of the defect and the proximity to free margins a Z-plasty was deemed most appropriate for repair. Using a sterile surgical marker, the appropriate transposition arms of the Z-plasty were drawn incorporating the defect and placing the expected incisions within the relaxed skin tension lines where possible. The area thus outlined was incised deep to adipose tissue with a #15 scalpel blade. The skin margins were undermined to an appropriate distance in all directions utilizing iris scissors. The opposing transposition arms were then transposed and carried over into place in opposite direction and anchored with interrupted buried subcutaneous sutures. Double Z Plasty Text: The lesion was extirpated to the level of the fat with a #15 scalpel blade. Given the location of the defect, shape of the defect and the proximity to free margins a double Z-plasty was deemed most appropriate for repair. Using a sterile surgical marker, the appropriate transposition arms of the double Z-plasty were drawn incorporating the defect and placing the expected incisions within the relaxed skin tension lines where possible. The area thus outlined was incised deep to adipose tissue with a #15 scalpel blade. The skin margins were undermined to an appropriate distance in all directions utilizing iris scissors. The opposing transposition arms were then transposed and carried over into place in opposite direction and anchored with interrupted buried subcutaneous sutures. Zygomaticofacial Flap Text: Given the location of the defect, shape of the defect and the proximity to free margins a zygomaticofacial flap was deemed most appropriate for repair. Using a sterile surgical marker, the appropriate flap was drawn incorporating the defect and placing the expected incisions within the relaxed skin tension lines where possible. The area thus outlined was incised deep to adipose tissue with a #15 scalpel blade with preservation of a vascular pedicle.  The skin margins were undermined to an appropriate distance in all directions utilizing iris scissors. The flap was then carried over into the defect and anchored with interrupted buried subcutaneous sutures. Cheek Interpolation Flap Text: A decision was made to reconstruct the defect utilizing an interpolation axial flap and a staged reconstruction.  A telfa template was made of the defect.  This telfa template was then used to outline the Cheek Interpolation flap.  The donor area for the pedicle flap was then injected with anesthesia.  The flap was excised through the skin and subcutaneous tissue down to the layer of the underlying musculature.  The interpolation flap was carefully excised within this deep plane to maintain its blood supply.  The edges of the donor site were undermined.   The donor site was closed in a primary fashion.  The pedicle was then rotated into position and sutured.  Once the tube was sutured into place, adequate blood supply was confirmed with blanching and refill.  The pedicle was then wrapped with xeroform gauze and dressed appropriately with a telfa and gauze bandage to ensure continued blood supply and protect the attached pedicle. Cheek-To-Nose Interpolation Flap Text: A decision was made to reconstruct the defect utilizing an interpolation axial flap and a staged reconstruction.  A telfa template was made of the defect.  This telfa template was then used to outline the Cheek-To-Nose Interpolation flap.  The donor area for the pedicle flap was then injected with anesthesia.  The flap was excised through the skin and subcutaneous tissue down to the layer of the underlying musculature.  The interpolation flap was carefully excised within this deep plane to maintain its blood supply.  The edges of the donor site were undermined.   The donor site was closed in a primary fashion.  The pedicle was then rotated into position and sutured.  Once the tube was sutured into place, adequate blood supply was confirmed with blanching and refill.  The pedicle was then wrapped with xeroform gauze and dressed appropriately with a telfa and gauze bandage to ensure continued blood supply and protect the attached pedicle. Interpolation Flap Text: A decision was made to reconstruct the defect utilizing an interpolation axial flap and a staged reconstruction.  A telfa template was made of the defect.  This telfa template was then used to outline the interpolation flap.  The donor area for the pedicle flap was then injected with anesthesia.  The flap was excised through the skin and subcutaneous tissue down to the layer of the underlying musculature.  The interpolation flap was carefully excised within this deep plane to maintain its blood supply.  The edges of the donor site were undermined.   The donor site was closed in a primary fashion.  The pedicle was then rotated into position and sutured.  Once the tube was sutured into place, adequate blood supply was confirmed with blanching and refill.  The pedicle was then wrapped with xeroform gauze and dressed appropriately with a telfa and gauze bandage to ensure continued blood supply and protect the attached pedicle. Melolabial Interpolation Flap Text: A decision was made to reconstruct the defect utilizing an interpolation axial flap and a staged reconstruction.  A telfa template was made of the defect.  This telfa template was then used to outline the melolabial interpolation flap.  The donor area for the pedicle flap was then injected with anesthesia.  The flap was excised through the skin and subcutaneous tissue down to the layer of the underlying musculature.  The pedicle flap was carefully excised within this deep plane to maintain its blood supply.  The edges of the donor site were undermined.   The donor site was closed in a primary fashion.  The pedicle was then rotated into position and sutured.  Once the tube was sutured into place, adequate blood supply was confirmed with blanching and refill.  The pedicle was then wrapped with xeroform gauze and dressed appropriately with a telfa and gauze bandage to ensure continued blood supply and protect the attached pedicle. Mastoid Interpolation Flap Text: A decision was made to reconstruct the defect utilizing an interpolation axial flap and a staged reconstruction.  A telfa template was made of the defect.  This telfa template was then used to outline the mastoid interpolation flap.  The donor area for the pedicle flap was then injected with anesthesia.  The flap was excised through the skin and subcutaneous tissue down to the layer of the underlying musculature.  The pedicle flap was carefully excised within this deep plane to maintain its blood supply.  The edges of the donor site were undermined.   The donor site was closed in a primary fashion.  The pedicle was then rotated into position and sutured.  Once the tube was sutured into place, adequate blood supply was confirmed with blanching and refill.  The pedicle was then wrapped with xeroform gauze and dressed appropriately with a telfa and gauze bandage to ensure continued blood supply and protect the attached pedicle. Posterior Auricular Interpolation Flap Text: A decision was made to reconstruct the defect utilizing an interpolation axial flap and a staged reconstruction.  A telfa template was made of the defect.  This telfa template was then used to outline the posterior auricular interpolation flap.  The donor area for the pedicle flap was then injected with anesthesia.  The flap was excised through the skin and subcutaneous tissue down to the layer of the underlying musculature.  The pedicle flap was carefully excised within this deep plane to maintain its blood supply.  The edges of the donor site were undermined.   The donor site was closed in a primary fashion.  The pedicle was then rotated into position and sutured.  Once the tube was sutured into place, adequate blood supply was confirmed with blanching and refill.  The pedicle was then wrapped with xeroform gauze and dressed appropriately with a telfa and gauze bandage to ensure continued blood supply and protect the attached pedicle. Paramedian Forehead Flap Text: A decision was made to reconstruct the defect utilizing an interpolation axial flap and a staged reconstruction.  A telfa template was made of the defect.  This telfa template was then used to outline the paramedian forehead pedicle flap.  The donor area for the pedicle flap was then injected with anesthesia.  The flap was excised through the skin and subcutaneous tissue down to the layer of the underlying musculature.  The pedicle flap was carefully excised within this deep plane to maintain its blood supply.  The edges of the donor site were undermined.   The donor site was closed in a primary fashion.  The pedicle was then rotated into position and sutured.  Once the tube was sutured into place, adequate blood supply was confirmed with blanching and refill.  The pedicle was then wrapped with xeroform gauze and dressed appropriately with a telfa and gauze bandage to ensure continued blood supply and protect the attached pedicle. Abbe Flap (Upper To Lower Lip) Text: The defect of the lower lip was assessed and measured.  Given the location and size of the defect, an Abbe flap was deemed most appropriate. Using a sterile surgical marker, an appropriate Abbe flap was measured and drawn on the upper lip. Local anesthesia was then infiltrated.  A scalpel was then used to incise the upper lip through and through the skin, vermilion, muscle and mucosa, leaving the flap pedicled on the opposite side.  The flap was then rotated and transferred to the lower lip defect.  The flap was then sutured into place with a three layer technique, closing the orbicularis oris muscle layer with subcutaneous buried sutures, followed by a mucosal layer and an epidermal layer. Abbe Flap (Lower To Upper Lip) Text: The defect of the upper lip was assessed and measured.  Given the location and size of the defect, an Abbe flap was deemed most appropriate. Using a sterile surgical marker, an appropriate Abbe flap was measured and drawn on the lower lip. Local anesthesia was then infiltrated. A scalpel was then used to incise the upper lip through and through the skin, vermilion, muscle and mucosa, leaving the flap pedicled on the opposite side.  The flap was then rotated and transferred to the lower lip defect.  The flap was then sutured into place with a three layer technique, closing the orbicularis oris muscle layer with subcutaneous buried sutures, followed by a mucosal layer and an epidermal layer. Estlander Flap (Upper To Lower Lip) Text: The defect of the lower lip was assessed and measured.  Given the location and size of the defect, an Estlander flap was deemed most appropriate. Using a sterile surgical marker, an appropriate Estlander flap was measured and drawn on the upper lip. Local anesthesia was then infiltrated. A scalpel was then used to incise the lateral aspect of the flap, through skin, muscle and mucosa, leaving the flap pedicled medially.  The flap was then rotated and positioned to fill the lower lip defect.  The flap was then sutured into place with a three layer technique, closing the orbicularis oris muscle layer with subcutaneous buried sutures, followed by a mucosal layer and an epidermal layer. Lip Wedge Excision Repair Text: Given the location of the defect and the proximity to free margins a full thickness wedge repair was deemed most appropriate. Using a sterile surgical marker, the appropriate repair was drawn incorporating the defect and placing the expected incisions perpendicular to the vermilion border.  The vermilion border was also meticulously outlined to ensure appropriate reapproximation during the repair.  The area thus outlined was incised through and through with a #15 scalpel blade.  The muscularis and dermis were reaproximated with deep sutures following hemostasis. Care was taken to realign the vermilion border before proceeding with the superficial closure.  Once the vermilion was realigned the superfical and mucosal closure was finished. Ftsg Text: The defect edges were debeveled with a #15 scalpel blade. Given the location of the defect, shape of the defect and the proximity to free margins a full thickness skin graft was deemed most appropriate. Using a sterile surgical marker, the primary defect shape was transferred to the donor site. The area thus outlined was incised deep to adipose tissue with a #15 scalpel blade.  The harvested graft was then trimmed of adipose tissue until only dermis and epidermis was left.  The skin graft was then placed in the primary defect and oriented appropriately. Split-Thickness Skin Graft Text: The defect edges were debeveled with a #15 scalpel blade. Given the location of the defect, shape of the defect and the proximity to free margins a split thickness skin graft was deemed most appropriate. Using a sterile surgical marker, the primary defect shape was transferred to the donor site. The split thickness graft was then harvested.  The skin graft was then placed in the primary defect and oriented appropriately. Pinch Graft Text: The defect edges were debeveled with a #15 scalpel blade. Given the location of the defect, shape of the defect and the proximity to free margins a pinch graft was deemed most appropriate. Using a sterile surgical marker, the primary defect shape was transferred to the donor site. The area thus outlined was incised deep to adipose tissue with a #15 scalpel blade.  The harvested graft was then trimmed of adipose tissue until only dermis and epidermis was left. The skin graft was then placed in the primary defect and oriented appropriately. Burow's Graft Text: The defect edges were debeveled with a #15 scalpel blade. Given the location of the defect, shape of the defect, the proximity to free margins and the presence of a standing cone deformity a Burow's skin graft was deemed most appropriate. The standing cone was removed and this tissue was then trimmed to the shape of the primary defect. The adipose tissue was also removed until only dermis and epidermis were left.  The skin graft was then placed in the primary defect and oriented appropriately. Cartilage Graft Text: The defect edges were debeveled with a #15 scalpel blade. Given the location of the defect, shape of the defect, the fact the defect involved a full thickness cartilage defect a cartilage graft was deemed most appropriate.  An appropriate donor site was identified, cleansed, and anesthetized. The cartilage graft was then harvested and transferred to the recipient site, oriented appropriately and then sutured into place.  The secondary defect was then repaired using a primary closure. Composite Graft Text: The defect edges were debeveled with a #15 scalpel blade. Given the location of the defect, shape of the defect, the proximity to free margins and the fact the defect was full thickness a composite graft was deemed most appropriate.  The defect was outline and then transferred to the donor site.  A full thickness graft was then excised from the donor site. The graft was then placed in the primary defect, oriented appropriately and then sutured into place.  The secondary defect was then repaired using a primary closure. Epidermal Autograft Text: The defect edges were debeveled with a #15 scalpel blade. Given the location of the defect, shape of the defect and the proximity to free margins an epidermal autograft was deemed most appropriate. Using a sterile surgical marker, the primary defect shape was transferred to the donor site. The epidermal graft was then harvested.  The skin graft was then placed in the primary defect and oriented appropriately. Dermal Autograft Text: The defect edges were debeveled with a #15 scalpel blade. Given the location of the defect, shape of the defect and the proximity to free margins a dermal autograft was deemed most appropriate. Using a sterile surgical marker, the primary defect shape was transferred to the donor site. The area thus outlined was incised deep to adipose tissue with a #15 scalpel blade.  The harvested graft was then trimmed of adipose and epidermal tissue until only dermis was left.  The skin graft was then placed in the primary defect and oriented appropriately. Skin Substitute Text: The defect edges were debeveled with a #15 scalpel blade. Given the location of the defect, shape of the defect and the proximity to free margins a skin substitute graft was deemed most appropriate.  The graft material was trimmed to fit the size of the defect. The graft was then placed in the primary defect and oriented appropriately. Tissue Cultured Epidermal Autograft Text: The defect edges were debeveled with a #15 scalpel blade. Given the location of the defect, shape of the defect and the proximity to free margins a tissue cultured epidermal autograft was deemed most appropriate.  The graft was then trimmed to fit the size of the defect.  The graft was then placed in the primary defect and oriented appropriately. Xenograft Text: The defect edges were debeveled with a #15 scalpel blade. Given the location of the defect, shape of the defect and the proximity to free margins a xenograft was deemed most appropriate.  The graft was then trimmed to fit the size of the defect.  The graft was then placed in the primary defect and oriented appropriately. Purse String (Intermediate) Text: Given the location of the defect and the characteristics of the surrounding skin a purse string intermediate closure was deemed most appropriate.  Undermining was performed circumferentially around the surgical defect.  A purse string suture was then placed and tightened. Purse String (Simple) Text: Given the location of the defect and the characteristics of the surrounding skin a purse string simple closure was deemed most appropriate.  Undermining was performed circumferentially around the surgical defect.  A purse string suture was then placed and tightened. Partial Purse String (Intermediate) Text: Given the location of the defect and the characteristics of the surrounding skin an intermediate purse string closure was deemed most appropriate.  Undermining was performed circumferentially around the surgical defect.  A purse string suture was then placed and tightened. Wound tension of the circular defect prevented complete closure of the wound. Partial Purse String (Simple) Text: Given the location of the defect and the characteristics of the surrounding skin a simple purse string closure was deemed most appropriate.  Undermining was performed circumferentially around the surgical defect.  A purse string suture was then placed and tightened. Wound tension of the circular defect prevented complete closure of the wound. Complex Repair And Single Advancement Flap Text: The defect edges were debeveled with a #15 scalpel blade.  The primary defect was closed partially with a complex linear closure.  Given the location of the remaining defect, shape of the defect and the proximity to free margins a single advancement flap was deemed most appropriate for complete closure of the defect.  Using a sterile surgical marker, an appropriate advancement flap was drawn incorporating the defect and placing the expected incisions within the relaxed skin tension lines where possible. The area thus outlined was incised deep to adipose tissue with a #15 scalpel blade. The skin margins were undermined to an appropriate distance in all directions utilizing iris scissors and carried over to close the primary defect. Complex Repair And Double Advancement Flap Text: The defect edges were debeveled with a #15 scalpel blade.  The primary defect was closed partially with a complex linear closure.  Given the location of the remaining defect, shape of the defect and the proximity to free margins a double advancement flap was deemed most appropriate for complete closure of the defect.  Using a sterile surgical marker, an appropriate advancement flap was drawn incorporating the defect and placing the expected incisions within the relaxed skin tension lines where possible. The area thus outlined was incised deep to adipose tissue with a #15 scalpel blade. The skin margins were undermined to an appropriate distance in all directions utilizing iris scissors and carried over to close the primary defect. Complex Repair And Modified Advancement Flap Text: The defect edges were debeveled with a #15 scalpel blade.  The primary defect was closed partially with a complex linear closure.  Given the location of the remaining defect, shape of the defect and the proximity to free margins a modified advancement flap was deemed most appropriate for complete closure of the defect.  Using a sterile surgical marker, an appropriate advancement flap was drawn incorporating the defect and placing the expected incisions within the relaxed skin tension lines where possible. The area thus outlined was incised deep to adipose tissue with a #15 scalpel blade. The skin margins were undermined to an appropriate distance in all directions utilizing iris scissors and carried over to close the primary defect. Complex Repair And A-T Advancement Flap Text: The defect edges were debeveled with a #15 scalpel blade.  The primary defect was closed partially with a complex linear closure.  Given the location of the remaining defect, shape of the defect and the proximity to free margins an A-T advancement flap was deemed most appropriate for complete closure of the defect.  Using a sterile surgical marker, an appropriate advancement flap was drawn incorporating the defect and placing the expected incisions within the relaxed skin tension lines where possible. The area thus outlined was incised deep to adipose tissue with a #15 scalpel blade. The skin margins were undermined to an appropriate distance in all directions utilizing iris scissors and carried over to close the primary defect. Complex Repair And O-T Advancement Flap Text: The defect edges were debeveled with a #15 scalpel blade.  The primary defect was closed partially with a complex linear closure.  Given the location of the remaining defect, shape of the defect and the proximity to free margins an O-T advancement flap was deemed most appropriate for complete closure of the defect.  Using a sterile surgical marker, an appropriate advancement flap was drawn incorporating the defect and placing the expected incisions within the relaxed skin tension lines where possible. The area thus outlined was incised deep to adipose tissue with a #15 scalpel blade. The skin margins were undermined to an appropriate distance in all directions utilizing iris scissors and carried over to close the primary defect. Complex Repair And O-L Flap Text: The defect edges were debeveled with a #15 scalpel blade.  The primary defect was closed partially with a complex linear closure.  Given the location of the remaining defect, shape of the defect and the proximity to free margins an O-L flap was deemed most appropriate for complete closure of the defect.  Using a sterile surgical marker, an appropriate flap was drawn incorporating the defect and placing the expected incisions within the relaxed skin tension lines where possible. The area thus outlined was incised deep to adipose tissue with a #15 scalpel blade. The skin margins were undermined to an appropriate distance in all directions utilizing iris scissors and carried over to close the primary defect. Complex Repair And Bilobe Flap Text: The defect edges were debeveled with a #15 scalpel blade.  The primary defect was closed partially with a complex linear closure.  Given the location of the remaining defect, shape of the defect and the proximity to free margins a bilobe flap was deemed most appropriate for complete closure of the defect.  Using a sterile surgical marker, an appropriate advancement flap was drawn incorporating the defect and placing the expected incisions within the relaxed skin tension lines where possible. The area thus outlined was incised deep to adipose tissue with a #15 scalpel blade. The skin margins were undermined to an appropriate distance in all directions utilizing iris scissors and carried over to close the primary defect. Complex Repair And Melolabial Flap Text: The defect edges were debeveled with a #15 scalpel blade.  The primary defect was closed partially with a complex linear closure.  Given the location of the remaining defect, shape of the defect and the proximity to free margins a melolabial flap was deemed most appropriate for complete closure of the defect.  Using a sterile surgical marker, an appropriate advancement flap was drawn incorporating the defect and placing the expected incisions within the relaxed skin tension lines where possible. The area thus outlined was incised deep to adipose tissue with a #15 scalpel blade. The skin margins were undermined to an appropriate distance in all directions utilizing iris scissors and carried over to close the primary defect. Complex Repair And Rotation Flap Text: The defect edges were debeveled with a #15 scalpel blade.  The primary defect was closed partially with a complex linear closure.  Given the location of the remaining defect, shape of the defect and the proximity to free margins a rotation flap was deemed most appropriate for complete closure of the defect.  Using a sterile surgical marker, an appropriate advancement flap was drawn incorporating the defect and placing the expected incisions within the relaxed skin tension lines where possible. The area thus outlined was incised deep to adipose tissue with a #15 scalpel blade. The skin margins were undermined to an appropriate distance in all directions utilizing iris scissors and carried over to close the primary defect. Complex Repair And Rhombic Flap Text: The defect edges were debeveled with a #15 scalpel blade.  The primary defect was closed partially with a complex linear closure.  Given the location of the remaining defect, shape of the defect and the proximity to free margins a rhombic flap was deemed most appropriate for complete closure of the defect.  Using a sterile surgical marker, an appropriate advancement flap was drawn incorporating the defect and placing the expected incisions within the relaxed skin tension lines where possible. The area thus outlined was incised deep to adipose tissue with a #15 scalpel blade. The skin margins were undermined to an appropriate distance in all directions utilizing iris scissors and carried over to close the primary defect. Complex Repair And Transposition Flap Text: The defect edges were debeveled with a #15 scalpel blade.  The primary defect was closed partially with a complex linear closure.  Given the location of the remaining defect, shape of the defect and the proximity to free margins a transposition flap was deemed most appropriate for complete closure of the defect.  Using a sterile surgical marker, an appropriate advancement flap was drawn incorporating the defect and placing the expected incisions within the relaxed skin tension lines where possible. The area thus outlined was incised deep to adipose tissue with a #15 scalpel blade. The skin margins were undermined to an appropriate distance in all directions utilizing iris scissors and carried over to close the primary defect. Complex Repair And V-Y Plasty Text: The defect edges were debeveled with a #15 scalpel blade.  The primary defect was closed partially with a complex linear closure.  Given the location of the remaining defect, shape of the defect and the proximity to free margins a V-Y plasty was deemed most appropriate for complete closure of the defect.  Using a sterile surgical marker, an appropriate advancement flap was drawn incorporating the defect and placing the expected incisions within the relaxed skin tension lines where possible. The area thus outlined was incised deep to adipose tissue with a #15 scalpel blade. The skin margins were undermined to an appropriate distance in all directions utilizing iris scissors and carried over to close the primary defect. Complex Repair And M Plasty Text: The defect edges were debeveled with a #15 scalpel blade.  The primary defect was closed partially with a complex linear closure.  Given the location of the remaining defect, shape of the defect and the proximity to free margins an M plasty was deemed most appropriate for complete closure of the defect.  Using a sterile surgical marker, an appropriate advancement flap was drawn incorporating the defect and placing the expected incisions within the relaxed skin tension lines where possible. The area thus outlined was incised deep to adipose tissue with a #15 scalpel blade. The skin margins were undermined to an appropriate distance in all directions utilizing iris scissors and carried over to close the primary defect. Complex Repair And Double M Plasty Text: The defect edges were debeveled with a #15 scalpel blade.  The primary defect was closed partially with a complex linear closure.  Given the location of the remaining defect, shape of the defect and the proximity to free margins a double M plasty was deemed most appropriate for complete closure of the defect.  Using a sterile surgical marker, an appropriate advancement flap was drawn incorporating the defect and placing the expected incisions within the relaxed skin tension lines where possible. The area thus outlined was incised deep to adipose tissue with a #15 scalpel blade. The skin margins were undermined to an appropriate distance in all directions utilizing iris scissors and carried over to close the primary defect. Complex Repair And W Plasty Text: The defect edges were debeveled with a #15 scalpel blade.  The primary defect was closed partially with a complex linear closure.  Given the location of the remaining defect, shape of the defect and the proximity to free margins a W plasty was deemed most appropriate for complete closure of the defect.  Using a sterile surgical marker, an appropriate advancement flap was drawn incorporating the defect and placing the expected incisions within the relaxed skin tension lines where possible. The area thus outlined was incised deep to adipose tissue with a #15 scalpel blade. The skin margins were undermined to an appropriate distance in all directions utilizing iris scissors and carried over to close the primary defect. Complex Repair And Z Plasty Text: The defect edges were debeveled with a #15 scalpel blade.  The primary defect was closed partially with a complex linear closure.  Given the location of the remaining defect, shape of the defect and the proximity to free margins a Z plasty was deemed most appropriate for complete closure of the defect.  Using a sterile surgical marker, an appropriate advancement flap was drawn incorporating the defect and placing the expected incisions within the relaxed skin tension lines where possible. The area thus outlined was incised deep to adipose tissue with a #15 scalpel blade. The skin margins were undermined to an appropriate distance in all directions utilizing iris scissors and carried over to close the primary defect. Complex Repair And Dorsal Nasal Flap Text: The defect edges were debeveled with a #15 scalpel blade.  The primary defect was closed partially with a complex linear closure.  Given the location of the remaining defect, shape of the defect and the proximity to free margins a dorsal nasal flap was deemed most appropriate for complete closure of the defect.  Using a sterile surgical marker, an appropriate flap was drawn incorporating the defect and placing the expected incisions within the relaxed skin tension lines where possible. The area thus outlined was incised deep to adipose tissue with a #15 scalpel blade. The skin margins were undermined to an appropriate distance in all directions utilizing iris scissors and carried over to close the primary defect. Complex Repair And Ftsg Text: The defect edges were debeveled with a #15 scalpel blade.  The primary defect was closed partially with a complex linear closure.  Given the location of the defect, shape of the defect and the proximity to free margins a full thickness skin graft was deemed most appropriate to repair the remaining defect.  The graft was trimmed to fit the size of the remaining defect.  The graft was then placed in the primary defect, oriented appropriately, and sutured into place. Complex Repair And Burow's Graft Text: The defect edges were debeveled with a #15 scalpel blade.  The primary defect was closed partially with a complex linear closure.  Given the location of the defect, shape of the defect, the proximity to free margins and the presence of a standing cone deformity a Burow's graft was deemed most appropriate to repair the remaining defect.  The graft was trimmed to fit the size of the remaining defect.  The graft was then placed in the primary defect, oriented appropriately, and sutured into place. Complex Repair And Split-Thickness Skin Graft Text: The defect edges were debeveled with a #15 scalpel blade.  The primary defect was closed partially with a complex linear closure.  Given the location of the defect, shape of the defect and the proximity to free margins a split thickness skin graft was deemed most appropriate to repair the remaining defect.  The graft was trimmed to fit the size of the remaining defect.  The graft was then placed in the primary defect, oriented appropriately, and sutured into place. Complex Repair And Epidermal Autograft Text: The defect edges were debeveled with a #15 scalpel blade.  The primary defect was closed partially with a complex linear closure.  Given the location of the defect, shape of the defect and the proximity to free margins an epidermal autograft was deemed most appropriate to repair the remaining defect.  The graft was trimmed to fit the size of the remaining defect.  The graft was then placed in the primary defect, oriented appropriately, and sutured into place. Complex Repair And Dermal Autograft Text: The defect edges were debeveled with a #15 scalpel blade.  The primary defect was closed partially with a complex linear closure.  Given the location of the defect, shape of the defect and the proximity to free margins an dermal autograft was deemed most appropriate to repair the remaining defect.  The graft was trimmed to fit the size of the remaining defect.  The graft was then placed in the primary defect, oriented appropriately, and sutured into place. Complex Repair And Tissue Cultured Epidermal Autograft Text: The defect edges were debeveled with a #15 scalpel blade.  The primary defect was closed partially with a complex linear closure.  Given the location of the defect, shape of the defect and the proximity to free margins an tissue cultured epidermal autograft was deemed most appropriate to repair the remaining defect.  The graft was trimmed to fit the size of the remaining defect.  The graft was then placed in the primary defect, oriented appropriately, and sutured into place. Complex Repair And Xenograft Text: The defect edges were debeveled with a #15 scalpel blade.  The primary defect was closed partially with a complex linear closure.  Given the location of the defect, shape of the defect and the proximity to free margins a xenograft was deemed most appropriate to repair the remaining defect.  The graft was trimmed to fit the size of the remaining defect.  The graft was then placed in the primary defect, oriented appropriately, and sutured into place. Complex Repair And Skin Substitute Graft Text: The defect edges were debeveled with a #15 scalpel blade.  The primary defect was closed partially with a complex linear closure.  Given the location of the remaining defect, shape of the defect and the proximity to free margins a skin substitute graft was deemed most appropriate to repair the remaining defect.  The graft was trimmed to fit the size of the remaining defect.  The graft was then placed in the primary defect, oriented appropriately, and sutured into place. Include Anticoagulation In Mohs Note?: Please Select the Appropriate Response Path Notes (To The Dermatopathologist): Please check margins. Score at 12 Consent was obtained from the patient. The risks and benefits to therapy were discussed in detail. Specifically, the risks of infection, scarring, bleeding, prolonged wound healing, incomplete removal, allergy to anesthesia, nerve injury and recurrence were addressed. Prior to the procedure, the treatment site was clearly identified and confirmed by the patient. All components of Universal Protocol/PAUSE Rule completed. Render Post-Care Instructions In Note?: yes Post-Care Instructions: I reviewed with the patient in detail post-care instructions. Patient is not to engage in any heavy lifting, exercise, or swimming for the next 14 days. Should the patient develop any fevers, chills, bleeding, severe pain patient will contact the office immediately. Home Suture Removal Text: Patient was provided a home suture removal kit and will remove their sutures at home.  If they have any questions or difficulties they will call the office. Where Do You Want The Question To Include Opioid Counseling Located?: Case Summary Tab Information: Selecting Yes will display possible errors in your note based on the variables you have selected. This validation is only offered as a suggestion for you. PLEASE NOTE THAT THE VALIDATION TEXT WILL BE REMOVED WHEN YOU FINALIZE YOUR NOTE. IF YOU WANT TO FAX A PRELIMINARY NOTE YOU WILL NEED TO TOGGLE THIS TO 'NO' IF YOU DO NOT WANT IT IN YOUR FAXED NOTE.

## 2025-07-07 NOTE — PATIENT PROFILE ADULT - DO YOU NEED ADDITIONAL SERVICES TO MANAGE ANY OF THESE MEDICAL CONDITIONS AT HOME?
Caller: Tessie Mckeon    Relationship to patient: Self    Best call back number: [phone]    Type of visit: LAB, CT SCAN, VITALS, FU    Requested date: 9/15 OR AFTER     If rescheduling, when is the original appointment: 8/21, 8/28      no